# Patient Record
Sex: MALE | Race: BLACK OR AFRICAN AMERICAN | NOT HISPANIC OR LATINO | Employment: FULL TIME | URBAN - METROPOLITAN AREA
[De-identification: names, ages, dates, MRNs, and addresses within clinical notes are randomized per-mention and may not be internally consistent; named-entity substitution may affect disease eponyms.]

---

## 2024-01-10 ENCOUNTER — HOSPITAL ENCOUNTER (EMERGENCY)
Facility: HOSPITAL | Age: 54
Discharge: HOME | End: 2024-01-11
Attending: EMERGENCY MEDICINE
Payer: COMMERCIAL

## 2024-01-10 VITALS
SYSTOLIC BLOOD PRESSURE: 181 MMHG | HEART RATE: 80 BPM | DIASTOLIC BLOOD PRESSURE: 95 MMHG | HEIGHT: 71 IN | BODY MASS INDEX: 30.1 KG/M2 | RESPIRATION RATE: 18 BRPM | TEMPERATURE: 97.7 F | WEIGHT: 215 LBS | OXYGEN SATURATION: 97 %

## 2024-01-10 DIAGNOSIS — B34.9 VIRAL SYNDROME: Primary | ICD-10-CM

## 2024-01-10 PROCEDURE — 99283 EMERGENCY DEPT VISIT LOW MDM: CPT | Mod: 25

## 2024-01-10 PROCEDURE — 93010 ELECTROCARDIOGRAM REPORT: CPT | Performed by: EMERGENCY MEDICINE

## 2024-01-10 PROCEDURE — 99285 EMERGENCY DEPT VISIT HI MDM: CPT | Performed by: EMERGENCY MEDICINE

## 2024-01-10 PROCEDURE — 87636 SARSCOV2 & INF A&B AMP PRB: CPT

## 2024-01-10 PROCEDURE — 99284 EMERGENCY DEPT VISIT MOD MDM: CPT | Performed by: EMERGENCY MEDICINE

## 2024-01-10 ASSESSMENT — PAIN - FUNCTIONAL ASSESSMENT: PAIN_FUNCTIONAL_ASSESSMENT: 0-10

## 2024-01-10 ASSESSMENT — COLUMBIA-SUICIDE SEVERITY RATING SCALE - C-SSRS
6. HAVE YOU EVER DONE ANYTHING, STARTED TO DO ANYTHING, OR PREPARED TO DO ANYTHING TO END YOUR LIFE?: NO
2. HAVE YOU ACTUALLY HAD ANY THOUGHTS OF KILLING YOURSELF?: NO
1. IN THE PAST MONTH, HAVE YOU WISHED YOU WERE DEAD OR WISHED YOU COULD GO TO SLEEP AND NOT WAKE UP?: NO

## 2024-01-10 NOTE — Clinical Note
Jakob Rasmussen was seen and treated in our emergency department on 1/10/2024.  He may return to work on 01/12/2024.       If you have any questions or concerns, please don't hesitate to call.      Victorino Rosales MD MPH

## 2024-01-11 ENCOUNTER — CLINICAL SUPPORT (OUTPATIENT)
Dept: EMERGENCY MEDICINE | Facility: HOSPITAL | Age: 54
End: 2024-01-11
Payer: COMMERCIAL

## 2024-01-11 ENCOUNTER — APPOINTMENT (OUTPATIENT)
Dept: RADIOLOGY | Facility: HOSPITAL | Age: 54
End: 2024-01-11
Payer: COMMERCIAL

## 2024-01-11 LAB
ALBUMIN SERPL BCP-MCNC: 4.3 G/DL (ref 3.4–5)
ALP SERPL-CCNC: 81 U/L (ref 33–120)
ALT SERPL W P-5'-P-CCNC: 35 U/L (ref 10–52)
ANION GAP SERPL CALC-SCNC: 13 MMOL/L (ref 10–20)
AST SERPL W P-5'-P-CCNC: 28 U/L (ref 9–39)
ATRIAL RATE: 82 BPM
BASOPHILS # BLD AUTO: 0.04 X10*3/UL (ref 0–0.1)
BASOPHILS NFR BLD AUTO: 0.5 %
BILIRUB SERPL-MCNC: 0.7 MG/DL (ref 0–1.2)
BUN SERPL-MCNC: 11 MG/DL (ref 6–23)
CALCIUM SERPL-MCNC: 9.4 MG/DL (ref 8.6–10.6)
CARDIAC TROPONIN I PNL SERPL HS: <3 NG/L (ref 0–53)
CHLORIDE SERPL-SCNC: 102 MMOL/L (ref 98–107)
CO2 SERPL-SCNC: 28 MMOL/L (ref 21–32)
CREAT SERPL-MCNC: 0.78 MG/DL (ref 0.5–1.3)
EGFRCR SERPLBLD CKD-EPI 2021: >90 ML/MIN/1.73M*2
EOSINOPHIL # BLD AUTO: 0.03 X10*3/UL (ref 0–0.7)
EOSINOPHIL NFR BLD AUTO: 0.3 %
ERYTHROCYTE [DISTWIDTH] IN BLOOD BY AUTOMATED COUNT: 12.2 % (ref 11.5–14.5)
FLUAV RNA RESP QL NAA+PROBE: NOT DETECTED
FLUBV RNA RESP QL NAA+PROBE: NOT DETECTED
GLUCOSE SERPL-MCNC: 106 MG/DL (ref 74–99)
HCT VFR BLD AUTO: 37.8 % (ref 41–52)
HGB BLD-MCNC: 13.5 G/DL (ref 13.5–17.5)
IMM GRANULOCYTES # BLD AUTO: 0.02 X10*3/UL (ref 0–0.7)
IMM GRANULOCYTES NFR BLD AUTO: 0.2 % (ref 0–0.9)
LYMPHOCYTES # BLD AUTO: 2.18 X10*3/UL (ref 1.2–4.8)
LYMPHOCYTES NFR BLD AUTO: 24.8 %
MCH RBC QN AUTO: 37.1 PG (ref 26–34)
MCHC RBC AUTO-ENTMCNC: 35.7 G/DL (ref 32–36)
MCV RBC AUTO: 104 FL (ref 80–100)
MONOCYTES # BLD AUTO: 0.8 X10*3/UL (ref 0.1–1)
MONOCYTES NFR BLD AUTO: 9.1 %
NEUTROPHILS # BLD AUTO: 5.72 X10*3/UL (ref 1.2–7.7)
NEUTROPHILS NFR BLD AUTO: 65.1 %
NRBC BLD-RTO: 0 /100 WBCS (ref 0–0)
P AXIS: 75 DEGREES
P OFFSET: 215 MS
P ONSET: 155 MS
PLATELET # BLD AUTO: 258 X10*3/UL (ref 150–450)
POTASSIUM SERPL-SCNC: 3.8 MMOL/L (ref 3.5–5.3)
PR INTERVAL: 142 MS
PROT SERPL-MCNC: 8 G/DL (ref 6.4–8.2)
Q ONSET: 226 MS
QRS COUNT: 13 BEATS
QRS DURATION: 86 MS
QT INTERVAL: 382 MS
QTC CALCULATION(BAZETT): 446 MS
QTC FREDERICIA: 424 MS
R AXIS: 34 DEGREES
RBC # BLD AUTO: 3.64 X10*6/UL (ref 4.5–5.9)
SARS-COV-2 RNA RESP QL NAA+PROBE: DETECTED
SODIUM SERPL-SCNC: 139 MMOL/L (ref 136–145)
T AXIS: -8 DEGREES
T OFFSET: 417 MS
VENTRICULAR RATE: 82 BPM
WBC # BLD AUTO: 8.8 X10*3/UL (ref 4.4–11.3)

## 2024-01-11 PROCEDURE — 93005 ELECTROCARDIOGRAM TRACING: CPT

## 2024-01-11 PROCEDURE — 71045 X-RAY EXAM CHEST 1 VIEW: CPT | Mod: FOREIGN READ | Performed by: RADIOLOGY

## 2024-01-11 PROCEDURE — 2500000001 HC RX 250 WO HCPCS SELF ADMINISTERED DRUGS (ALT 637 FOR MEDICARE OP)

## 2024-01-11 PROCEDURE — 85025 COMPLETE CBC W/AUTO DIFF WBC: CPT

## 2024-01-11 PROCEDURE — 71045 X-RAY EXAM CHEST 1 VIEW: CPT

## 2024-01-11 PROCEDURE — 84484 ASSAY OF TROPONIN QUANT: CPT

## 2024-01-11 PROCEDURE — 80053 COMPREHEN METABOLIC PANEL: CPT

## 2024-01-11 PROCEDURE — 36415 COLL VENOUS BLD VENIPUNCTURE: CPT

## 2024-01-11 RX ORDER — IBUPROFEN 600 MG/1
600 TABLET ORAL ONCE
Status: COMPLETED | OUTPATIENT
Start: 2024-01-11 | End: 2024-01-11

## 2024-01-11 RX ADMIN — IBUPROFEN 600 MG: 600 TABLET, FILM COATED ORAL at 00:38

## 2024-01-11 NOTE — ED PROVIDER NOTES
HPI   Chief Complaint   Patient presents with    Flu Symptoms       Patient is a 53-year-old male with past medical history of hypertension, tobacco use presenting with concern for generalized bodyaches, malaise, cough, congestion, chest pain since Monday.  Patient endorses sick contact with similar symptoms and endorses he is concerned he might have COVID.  Patient particularly concerned about his chest pain that he describes as left-sided, front to back.  Patient endorses pain is sharp and does not report it particularly associates with breathing or coughing although he does endorse cough.        ------------------------------------------------------------------------------------------------------------------------------------------  PMH / PSH: as per HPI, otherwise reviewed in EMR and significant for: HTN, tobacco abuse  MEDS: as per HPI, otherwise reviewed in EMR and significant for: Previously on amlodipine but no current medications  ALLERGIES: as per HPI, otherwise reviewed in EMR and significant for: None per patient  SocH:  as per HPI, otherwise reviewed in EMR and significant for: Endorses long-term smoking history but quit on New years.  FH:  as per HPI, otherwise reviewed in EMR and significant for:                            [place] Coma Scale Score: 15                  Patient History   Past Medical History:   Diagnosis Date    Hypertension      Past Surgical History:   Procedure Laterality Date    CT ABDOMEN ANGIOGRAM W AND/OR WO IV CONTRAST  8/29/2019    CT ABDOMEN ANGIOGRAM W AND/OR WO IV CONTRAST 8[facility]29/2019 [facility]     No family history on file.  Social History     Tobacco Use    Smoking status: Unknown    Smokeless tobacco: Not on file   Substance Use Topics    Alcohol use: Not on file    Drug use: Not on file       Physical Exam   ED Triage Vitals [01/10/24 2250]   Temp Heart Rate Resp BP   36.5 °C (97.7 °F) 80 18 (!) 181/95      SpO2 Temp Source Heart Rate Source Patient Position    97 % Temporal -- --      BP Location FiO2 (%)     -- --       Physical Exam  Vitals and nursing note reviewed.   Constitutional:       General: He is not in acute distress.     Appearance: He is well-developed.   HENT:      Head: Normocephalic and atraumatic.      Mouth/Throat:      Comments: Moist mucous membranes.  Eyes:      Conjunctiva/sclera: Conjunctivae normal.   Cardiovascular:      Rate and Rhythm: Normal rate and regular rhythm.      Heart sounds: No murmur heard.  Pulmonary:      Effort: Pulmonary effort is normal. No respiratory distress.      Breath sounds: Normal breath sounds.   Abdominal:      Palpations: Abdomen is soft.      Tenderness: There is no abdominal tenderness.   Musculoskeletal:         General: No swelling.      Cervical back: Neck supple.   Skin:     General: Skin is warm and dry.      Capillary Refill: Capillary refill takes less than 2 seconds.   Neurological:      Mental Status: He is alert.   Psychiatric:         Mood and Affect: Mood normal.         ED Course & MDM   Diagnoses as of 01/11/24 0145   Viral syndrome       Medical Decision Making  EKG shows a normal rate of 82bpm, normal sinus rhythm, normal axis, normal intervals. Normal ST and T wave pattern with no evidence of acute ischemia or other acute findings.    Patient is a 53-year-old male with past medical history of hypertension, tobacco use presenting with concern for generalized bodyaches, malaise, cough, congestion, chest pain since Monday.  Constellation of symptoms most consistent with viral syndrome, in particular flu or COVID.  Patient treated symptomatically with ibuprofen.  Patient's chest pain is most probably secondary to his viral myalgia/costochondritis but given risk factors and description of sharp chest pain radiating from front to back, further cardiac evaluation was pursued.  EKG generally benign as above.  Point-of-care ultrasound with no evidence of aortic root or abdominal aorta dilation.  LV  systolic function grossly normal, with no effusion.  Troponin less than 3 and not trended given extended duration of chest pain.  Chest x-ray obtained with no evidence of bacterial superinfection or other acute cardiopulmonary pathology.   Symptomatic management at home discussed with patient.  Return precautions discussed with patient and patient discharged home.    Patient seen and discussed with Dr. Kobe Talley MD, PhD  Emergency Medicine PGY2            Procedure  Procedures     Dashawn Talley MD  Resident  01/11/24 0147

## 2024-01-11 NOTE — ED TRIAGE NOTES
Enters ED c/o 7/10 generalized muscle aches x several days. Denies sick contacts. Baseline history of HTN (non-compliant with prescribed medications.)

## 2024-01-11 NOTE — DISCHARGE INSTRUCTIONS
Your symptoms are most consistent with a viral illness.  We recommend symptomatic management with good hydration, acetaminophen, and ibuprofen.  There is no evidence of current heart disease on our assessment but we would recommend following with her primary care provider for continued management of your hypertension and helping with continued smoking cessation.  Your flu and COVID testing [facility] on [facility].

## 2024-05-21 ENCOUNTER — OFFICE VISIT (OUTPATIENT)
Dept: PRIMARY CARE | Facility: HOSPITAL | Age: 54
End: 2024-05-21
Payer: COMMERCIAL

## 2024-05-21 VITALS
WEIGHT: 218.9 LBS | DIASTOLIC BLOOD PRESSURE: 96 MMHG | BODY MASS INDEX: 30.65 KG/M2 | HEART RATE: 82 BPM | HEIGHT: 71 IN | SYSTOLIC BLOOD PRESSURE: 178 MMHG | OXYGEN SATURATION: 96 %

## 2024-05-21 DIAGNOSIS — Z87.891 FORMER SMOKER: ICD-10-CM

## 2024-05-21 DIAGNOSIS — D64.9 ANEMIA, UNSPECIFIED TYPE: ICD-10-CM

## 2024-05-21 DIAGNOSIS — L30.9 ECZEMA, UNSPECIFIED TYPE: ICD-10-CM

## 2024-05-21 DIAGNOSIS — N40.1 BENIGN PROSTATIC HYPERPLASIA WITH URINARY FREQUENCY: ICD-10-CM

## 2024-05-21 DIAGNOSIS — Z00.00 ROUTINE HEALTH MAINTENANCE: Primary | ICD-10-CM

## 2024-05-21 DIAGNOSIS — E78.5 DYSLIPIDEMIA: ICD-10-CM

## 2024-05-21 DIAGNOSIS — R35.0 BENIGN PROSTATIC HYPERPLASIA WITH URINARY FREQUENCY: ICD-10-CM

## 2024-05-21 DIAGNOSIS — I10 PRIMARY HYPERTENSION: ICD-10-CM

## 2024-05-21 PROCEDURE — 3077F SYST BP >= 140 MM HG: CPT | Performed by: INTERNAL MEDICINE

## 2024-05-21 PROCEDURE — 99396 PREV VISIT EST AGE 40-64: CPT | Performed by: INTERNAL MEDICINE

## 2024-05-21 PROCEDURE — 1036F TOBACCO NON-USER: CPT | Performed by: INTERNAL MEDICINE

## 2024-05-21 PROCEDURE — 3080F DIAST BP >= 90 MM HG: CPT | Performed by: INTERNAL MEDICINE

## 2024-05-21 RX ORDER — TRIAMCINOLONE ACETONIDE 1 MG/G
OINTMENT TOPICAL 2 TIMES DAILY PRN
Qty: 30 G | Refills: 3 | Status: SHIPPED | OUTPATIENT
Start: 2024-05-21

## 2024-05-21 RX ORDER — AMLODIPINE BESYLATE 5 MG/1
5 TABLET ORAL DAILY
Qty: 30 TABLET | Refills: 11 | Status: SHIPPED | OUTPATIENT
Start: 2024-05-21 | End: 2024-06-04 | Stop reason: DRUGHIGH

## 2024-05-21 ASSESSMENT — ENCOUNTER SYMPTOMS
OCCASIONAL FEELINGS OF UNSTEADINESS: 0
LOSS OF SENSATION IN FEET: 0
DEPRESSION: 0

## 2024-05-21 NOTE — PROGRESS NOTES
"Chief Complaint   Patient presents with    [facility]     New patient         History Of Present Illness:    Jakob Rasmussen is a 53 y.o. male presenting to establish care, update health maintenance and address elevated blood pressure.  Jakob has a history of high blood pressure treated with amlodipine.  He has not seen his primary care doctor recently and has been off of amlodipine for couple of years.  Recently has been experiencing retro-orbital, temporal and occipital headaches.  He has a family history of aneurysms and that triggered him to come in and get his blood pressure checked.  His headaches are associate with nausea, photophobia and phonophobia.  His headaches are never exertional.  He also reports intermittent vision changes.  He occasionally sees \"gutierrez of light\" across his visual fields.  They happen 2-3 times per month.  He had an eye exam about a year ago and the provider told him that his exam was normal.  Is unclear if he met with an optometrist or ophthalmologist.  Jakob was evaluated for abdominal pain in the emergency room in January.  His lab evaluation demonstrated macrocytic anemia.  He is a longstanding smoker but quit over the past year.  He has never had lung cancer screening.  He is not coughing or feeling out of breath.  She of eczema and now has 2 \"rough patches on his right ankle.  He suspects that the rough patches are eczema.    Echocardiogram 8/29/19  CONCLUSIONS:   1. The left ventricular systolic function is normal with a 55-60% estimated ejection fraction.   2. RVSP within normal limits.   3. No prior echocardiogram available for comparison.    8/29/19  CT angio abdomen/chest   Vascular:  1. No acute vascular pathology. Specifically there is no evidence of  aortic dissection.   Infrarenal abdominal aorta demonstrates diffuse  atherosclerotic change without significant focal stenosis or aneurysm.  Right common iliac artery demonstrates diffuse atherosclerotic " change  without significant focal stenosis.     Nonvascular:  1. There is a 3.8 cm stone noted at the gallbladder neck. Gallbladder  is also distended. Early acute cholecystitis cannot be excluded.  There are also areas of hyperemia in the liver adjacent to the  gallbladder which could be hyperemic reactive to inflammatory process  in the gallbladder. Further evaluation by obtaining right upper  quadrant ultrasound is recommended for better characterization if  clinically indicated.      Active Medical Problems:  Patient Active Problem List    Diagnosis Date Noted    Peripheral vascular disease (CMS-HCC) 2024    Anemia 2024    Benign prostatic hyperplasia with urinary frequency 2024    Dyslipidemia 2024    Former smoker 2024    Routine health maintenance 2024    Hypertension 2024    Eczema 2024       Past Medical History:  Past Medical History:   Diagnosis Date    Acute calculous cholecystitis 2019    Eczema     Hypertension     Peripheral vascular disease (CMS-HCC)     found incidentally on CTA 19       Past Surgical History:  Past Surgical History:   Procedure Laterality Date    CHOLECYSTECTOMY      CT ABDOMEN ANGIOGRAM W AND/OR WO IV CONTRAST  2019    CT ABDOMEN ANGIOGRAM W AND/OR WO IV CONTRAST 2019 [facility]         Social History:  Social History     Tobacco Use    Smoking status: Former     Current packs/day: 0.00     Types: Cigarettes     Quit date: 1993     Years since quittin.4    Smokeless tobacco: Never   Vaping Use    Vaping status: Never Used   Substance Use Topics    Alcohol use: Yes     Alcohol/week: 2.0 standard drinks of alcohol     Types: 2 Standard drinks or equivalent per week     Comment: occasional/weekends         Family History:  Family History   Problem Relation Name Age of Onset    Diabetes Mother      Hypertension Mother      Aneurysm Mother      Hypertension Father      Other (pacemaker) Father       "Diabetes Sister      Other (HTN) Sister      Diabetes Brother      Other (HTN) Brother      Stroke Brother      Diabetes Brother      Other (HTN) Brother      Diabetes Brother      Other (HTN) Brother      No Known Problems Daughter 6     No Known Problems Son 1         Allergies:  Patient has no known allergies.    Outpatient Medications:    Current Outpatient Medications:     amLODIPine (Norvasc) 5 mg tablet, Take 1 tablet (5 mg) by mouth once daily., Disp: 30 tablet, Rfl: 11    triamcinolone (Kenalog) 0.1 % ointment, Apply topically 2 times a day as needed for irritation or rash., Disp: 30 g, Rfl: 3    Review of Systems:  Pertinent positives in review of systems outlined above.  Complete ROS otherwise negative.        Last Recorded Vitals:  Vitals:    05/21/24 1504 05/21/24 1540   BP: (!) 185/100 (!) 178/96   BP Location: Left arm    Patient Position: Sitting    BP Cuff Size: Large adult    Pulse: 82    SpO2: 96%    Weight: 99.3 kg (218 lb 14.4 oz)    Height: 1.803 m (5' 11\")    Body mass index is 30.53 kg/m².        Physical Exam  Vitals reviewed.   Constitutional:       Appearance: Normal appearance.   HENT:      Mouth/Throat:      Pharynx: Oropharynx is clear.   Eyes:      Extraocular Movements: Extraocular movements intact.      Conjunctiva/sclera: Conjunctivae normal.      Pupils: Pupils are equal, round, and reactive to light.   Neck:      Vascular: No carotid bruit.   Cardiovascular:      Rate and Rhythm: Normal rate and regular rhythm.   Pulmonary:      Effort: Pulmonary effort is normal.      Breath sounds: Normal breath sounds.   Abdominal:      General: Abdomen is flat. Bowel sounds are normal.      Palpations: Abdomen is soft. There is no mass.      Tenderness: There is no abdominal tenderness. There is no right CVA tenderness or left CVA tenderness.   Musculoskeletal:      Cervical back: No tenderness.      Right lower leg: No edema.      Left lower leg: No edema.   Lymphadenopathy:      Cervical: No " cervical adenopathy.   Skin:     Comments: Well-circumscribed 3 to 4 cm patch on the right medial ankle with superficial scaling.  1 to 2 cm well-circumscribed patch with superficial scaling on right lateral calf.   Neurological:      General: No focal deficit present.      Mental Status: He is alert and oriented to person, place, and time.   Psychiatric:         Mood and Affect: Mood normal.          Assessment/Plan   Problem List Items Addressed This Visit       Hypertension     Comprehensive metabolic panel, TSH and urinalysis will be obtained now.  Jakob will restart amlodipine 5 mg daily.  Blood pressure will be assessed in 2 weeks.  Jakob has significant hypertension and a family history of aneurysms, and CNS imaging will be arranged if headaches do not resolve with improved control of blood pressure.         Relevant Medications    amLODIPine (Norvasc) 5 mg tablet    Other Relevant Orders    Comprehensive Metabolic Panel    TSH with reflex to Free T4 if abnormal    CBC and Auto Differential    Urinalysis with Reflex Microscopic    Eczema    Relevant Medications    triamcinolone (Kenalog) 0.1 % ointment    Other Relevant Orders    Comprehensive Metabolic Panel    Anemia    Relevant Orders    Comprehensive Metabolic Panel    CBC and Auto Differential    Ferritin    Iron and TIBC    Transferrin    Vitamin B12    Benign prostatic hyperplasia with urinary frequency     Will check PSA and urinalysis.  Symptoms are mild.  We will discuss adding daily tadalafil at his follow-up visit.         Relevant Orders    Prostate Specific Antigen    Dyslipidemia     Blood profile due now.         Relevant Orders    Comprehensive Metabolic Panel    Lipid Panel    Former smoker    Relevant Orders    CT lung screening low dose    Routine health maintenance - Primary     Blood pressure elevated and addressed separately.  Fasting blood sugar and fasting lipid profile will be arranged now.  PSA screening will be included with  blood work.  Low-dose CT chest will be obtained for lung cancer screening.  Colon cancer screen will be updated at follow-up visit.  Eye exam recommended and referral to ophthalmology placed.  Immunizations will be addressed at next visit.              Ramon Shaw MD

## 2024-05-22 ENCOUNTER — LAB (OUTPATIENT)
Dept: LAB | Facility: LAB | Age: 54
End: 2024-05-22
Payer: COMMERCIAL

## 2024-05-22 DIAGNOSIS — L30.9 ECZEMA, UNSPECIFIED TYPE: ICD-10-CM

## 2024-05-22 DIAGNOSIS — I10 PRIMARY HYPERTENSION: ICD-10-CM

## 2024-05-22 DIAGNOSIS — R35.0 BENIGN PROSTATIC HYPERPLASIA WITH URINARY FREQUENCY: ICD-10-CM

## 2024-05-22 DIAGNOSIS — N40.1 BENIGN PROSTATIC HYPERPLASIA WITH URINARY FREQUENCY: ICD-10-CM

## 2024-05-22 DIAGNOSIS — D64.9 ANEMIA, UNSPECIFIED TYPE: ICD-10-CM

## 2024-05-22 DIAGNOSIS — E78.5 DYSLIPIDEMIA: ICD-10-CM

## 2024-05-22 PROBLEM — Z87.891 FORMER SMOKER: Status: ACTIVE | Noted: 2024-05-22

## 2024-05-22 PROBLEM — I73.9 PERIPHERAL VASCULAR DISEASE (CMS-HCC): Status: ACTIVE | Noted: 2024-05-22

## 2024-05-22 PROBLEM — Z00.00 ROUTINE HEALTH MAINTENANCE: Status: ACTIVE | Noted: 2024-05-22

## 2024-05-22 LAB
ALBUMIN SERPL BCP-MCNC: 4.1 G/DL (ref 3.4–5)
ALP SERPL-CCNC: 68 U/L (ref 33–120)
ALT SERPL W P-5'-P-CCNC: 14 U/L (ref 10–52)
ANION GAP SERPL CALC-SCNC: 13 MMOL/L (ref 10–20)
APPEARANCE UR: CLEAR
AST SERPL W P-5'-P-CCNC: 16 U/L (ref 9–39)
BASOPHILS # BLD AUTO: 0.04 X10*3/UL (ref 0–0.1)
BASOPHILS NFR BLD AUTO: 0.5 %
BILIRUB SERPL-MCNC: 0.8 MG/DL (ref 0–1.2)
BILIRUB UR STRIP.AUTO-MCNC: NEGATIVE MG/DL
BUN SERPL-MCNC: 15 MG/DL (ref 6–23)
CALCIUM SERPL-MCNC: 9.3 MG/DL (ref 8.6–10.6)
CHLORIDE SERPL-SCNC: 102 MMOL/L (ref 98–107)
CHOLEST SERPL-MCNC: 185 MG/DL (ref 0–199)
CHOLESTEROL/HDL RATIO: 5
CO2 SERPL-SCNC: 28 MMOL/L (ref 21–32)
COLOR UR: ABNORMAL
CREAT SERPL-MCNC: 0.99 MG/DL (ref 0.5–1.3)
EGFRCR SERPLBLD CKD-EPI 2021: 90 ML/MIN/1.73M*2
EOSINOPHIL # BLD AUTO: 0.1 X10*3/UL (ref 0–0.7)
EOSINOPHIL NFR BLD AUTO: 1.3 %
ERYTHROCYTE [DISTWIDTH] IN BLOOD BY AUTOMATED COUNT: 12.8 % (ref 11.5–14.5)
FERRITIN SERPL-MCNC: 219 NG/ML (ref 20–300)
GLUCOSE SERPL-MCNC: 98 MG/DL (ref 74–99)
GLUCOSE UR STRIP.AUTO-MCNC: NORMAL MG/DL
HCT VFR BLD AUTO: 40.2 % (ref 41–52)
HDLC SERPL-MCNC: 37.3 MG/DL
HGB BLD-MCNC: 13.9 G/DL (ref 13.5–17.5)
IMM GRANULOCYTES # BLD AUTO: 0.03 X10*3/UL (ref 0–0.7)
IMM GRANULOCYTES NFR BLD AUTO: 0.4 % (ref 0–0.9)
IRON SATN MFR SERPL: 26 % (ref 25–45)
IRON SERPL-MCNC: 94 UG/DL (ref 35–150)
KETONES UR STRIP.AUTO-MCNC: NEGATIVE MG/DL
LDLC SERPL CALC-MCNC: 96 MG/DL
LEUKOCYTE ESTERASE UR QL STRIP.AUTO: NEGATIVE
LYMPHOCYTES # BLD AUTO: 3.25 X10*3/UL (ref 1.2–4.8)
LYMPHOCYTES NFR BLD AUTO: 42.3 %
MCH RBC QN AUTO: 34 PG (ref 26–34)
MCHC RBC AUTO-ENTMCNC: 34.6 G/DL (ref 32–36)
MCV RBC AUTO: 98 FL (ref 80–100)
MONOCYTES # BLD AUTO: 0.57 X10*3/UL (ref 0.1–1)
MONOCYTES NFR BLD AUTO: 7.4 %
NEUTROPHILS # BLD AUTO: 3.69 X10*3/UL (ref 1.2–7.7)
NEUTROPHILS NFR BLD AUTO: 48.1 %
NITRITE UR QL STRIP.AUTO: NEGATIVE
NON HDL CHOLESTEROL: 148 MG/DL (ref 0–149)
NRBC BLD-RTO: 0 /100 WBCS (ref 0–0)
PH UR STRIP.AUTO: 6 [PH]
PLATELET # BLD AUTO: 309 X10*3/UL (ref 150–450)
POTASSIUM SERPL-SCNC: 4 MMOL/L (ref 3.5–5.3)
PROT SERPL-MCNC: 7.7 G/DL (ref 6.4–8.2)
PROT UR STRIP.AUTO-MCNC: NEGATIVE MG/DL
PSA SERPL-MCNC: 0.56 NG/ML
RBC # BLD AUTO: 4.09 X10*6/UL (ref 4.5–5.9)
RBC # UR STRIP.AUTO: ABNORMAL /UL
RBC #/AREA URNS AUTO: NORMAL /HPF
SODIUM SERPL-SCNC: 139 MMOL/L (ref 136–145)
SP GR UR STRIP.AUTO: 1.02
TIBC SERPL-MCNC: 356 UG/DL (ref 240–445)
TRANSFERRIN SERPL-MCNC: 247 MG/DL (ref 200–360)
TRIGL SERPL-MCNC: 260 MG/DL (ref 0–149)
TSH SERPL-ACNC: 1.05 MIU/L (ref 0.44–3.98)
UIBC SERPL-MCNC: 262 UG/DL (ref 110–370)
UROBILINOGEN UR STRIP.AUTO-MCNC: NORMAL MG/DL
VIT B12 SERPL-MCNC: 142 PG/ML (ref 211–911)
VLDL: 52 MG/DL (ref 0–40)
WBC # BLD AUTO: 7.7 X10*3/UL (ref 4.4–11.3)
WBC #/AREA URNS AUTO: NORMAL /HPF

## 2024-05-22 PROCEDURE — 82607 VITAMIN B-12: CPT

## 2024-05-22 PROCEDURE — 84153 ASSAY OF PSA TOTAL: CPT

## 2024-05-22 PROCEDURE — 82728 ASSAY OF FERRITIN: CPT

## 2024-05-22 PROCEDURE — 80061 LIPID PANEL: CPT

## 2024-05-22 PROCEDURE — 85025 COMPLETE CBC W/AUTO DIFF WBC: CPT

## 2024-05-22 PROCEDURE — 36415 COLL VENOUS BLD VENIPUNCTURE: CPT

## 2024-05-22 PROCEDURE — 83550 IRON BINDING TEST: CPT

## 2024-05-22 PROCEDURE — 81001 URINALYSIS AUTO W/SCOPE: CPT

## 2024-05-22 PROCEDURE — 80053 COMPREHEN METABOLIC PANEL: CPT

## 2024-05-22 PROCEDURE — 84443 ASSAY THYROID STIM HORMONE: CPT

## 2024-05-22 PROCEDURE — 83540 ASSAY OF IRON: CPT

## 2024-05-22 PROCEDURE — 84466 ASSAY OF TRANSFERRIN: CPT

## 2024-05-22 NOTE — ASSESSMENT & PLAN NOTE
Blood pressure elevated and addressed separately.  Fasting blood sugar and fasting lipid profile will be arranged now.  PSA screening will be included with blood work.  Low-dose CT chest will be obtained for lung cancer screening.  Colon cancer screen will be updated at follow-up visit.  Eye exam recommended and referral to ophthalmology placed.  Immunizations will be addressed at next visit.

## 2024-05-22 NOTE — ASSESSMENT & PLAN NOTE
Comprehensive metabolic panel, TSH and urinalysis will be obtained now.  Jakob will restart amlodipine 5 mg daily.  Blood pressure will be assessed in 2 weeks.  Jakob has significant hypertension and a family history of aneurysms, and CNS imaging will be arranged if headaches do not resolve with improved control of blood pressure.

## 2024-05-22 NOTE — ASSESSMENT & PLAN NOTE
Will check PSA and urinalysis.  Symptoms are mild.  We will discuss adding daily tadalafil at his follow-up visit.

## 2024-06-04 ENCOUNTER — APPOINTMENT (OUTPATIENT)
Dept: PRIMARY CARE | Facility: HOSPITAL | Age: 54
End: 2024-06-04
Payer: COMMERCIAL

## 2024-06-04 ENCOUNTER — OFFICE VISIT (OUTPATIENT)
Dept: PRIMARY CARE | Facility: HOSPITAL | Age: 54
End: 2024-06-04
Payer: COMMERCIAL

## 2024-06-04 VITALS
HEART RATE: 91 BPM | OXYGEN SATURATION: 98 % | WEIGHT: 214.3 LBS | DIASTOLIC BLOOD PRESSURE: 80 MMHG | SYSTOLIC BLOOD PRESSURE: 134 MMHG | BODY MASS INDEX: 29.89 KG/M2

## 2024-06-04 DIAGNOSIS — R11.0 NAUSEA: ICD-10-CM

## 2024-06-04 DIAGNOSIS — R31.21 ASYMPTOMATIC MICROSCOPIC HEMATURIA: ICD-10-CM

## 2024-06-04 DIAGNOSIS — I10 HYPERTENSION, UNSPECIFIED TYPE: Primary | ICD-10-CM

## 2024-06-04 DIAGNOSIS — R23.9 SKIN CHANGE: ICD-10-CM

## 2024-06-04 DIAGNOSIS — E53.8 B12 DEFICIENCY: ICD-10-CM

## 2024-06-04 PROCEDURE — 99214 OFFICE O/P EST MOD 30 MIN: CPT | Performed by: INTERNAL MEDICINE

## 2024-06-04 RX ORDER — PNV NO.95/FERROUS FUM/FOLIC AC 28MG-0.8MG
100 TABLET ORAL DAILY
Qty: 30 TABLET | Refills: 11 | Status: SHIPPED | OUTPATIENT
Start: 2024-06-04 | End: 2025-06-04

## 2024-06-04 RX ORDER — AMLODIPINE BESYLATE 10 MG/1
10 TABLET ORAL DAILY
Qty: 30 TABLET | Refills: 5 | Status: SHIPPED | OUTPATIENT
Start: 2024-06-04 | End: 2024-12-01

## 2024-06-04 NOTE — PROGRESS NOTES
"Chief Complaint:   Follow-up (Go lab results)     History Of Present Illness:    Jakob Rasmussen is a 53 y.o. male with active medical problems outlined below who presents for blood pressure check.     INITIAL VISIT 5/21/24    Jakob has a history of high blood pressure treated with amlodipine.  He has not seen his primary care doctor recently and has been off of amlodipine for couple of years.  Recently has been experiencing retro-orbital, temporal and occipital headaches.  He has a family history of aneurysms and that triggered him to come in and get his blood pressure checked.  His headaches are associate with nausea, photophobia and phonophobia.  His headaches are never exertional.  He also reports intermittent vision changes.  He occasionally sees \"gutierrez of light\" across his visual fields.  They happen 2-3 times per month.  He had an eye exam about a year ago and the provider told him that his exam was normal.  Is unclear if he met with an optometrist or ophthalmologist.  Jakob was evaluated for abdominal pain in the emergency room in January.  His lab evaluation demonstrated macrocytic anemia.  He is a longstanding smoker and smoked more than one pack per day for over 30 years - quit over the past year.  He has never had lung cancer screening.  He is not coughing or feeling out of breath.  She of eczema and now has 2 \"rough patches on his right ankle.  He suspects that the rough patches are eczema.    Echocardiogram 8/29/19  CONCLUSIONS:   1. The left ventricular systolic function is normal with a 55-60% estimated ejection fraction.   2. RVSP within normal limits.   3. No prior echocardiogram available for comparison.    8/29/19  CT angio abdomen/chest   Vascular:  1. No acute vascular pathology. Specifically there is no evidence of  aortic dissection.   Infrarenal abdominal aorta demonstrates diffuse  atherosclerotic change without significant focal stenosis or aneurysm.  Right common iliac artery " demonstrates diffuse atherosclerotic change  without significant focal stenosis.     Nonvascular:  1. There is a 3.8 cm stone noted at the gallbladder neck. Gallbladder  is also distended. Early acute cholecystitis cannot be excluded.  There are also areas of hyperemia in the liver adjacent to the  gallbladder which could be hyperemic reactive to inflammatory process  in the gallbladder. Further evaluation by obtaining right upper  quadrant ultrasound is recommended for better characterization if  clinically indicated.      INTERVAL HISTORY 24  Recently started amlodipine - no side effects and no recent headaches   New B12 deficiency on labs- some nausea, no vomiting, no bloating/cramping or loose stool.  Recent UA showed 3-5 RBC.  No symptoms but has hx of smoking.  Hyperpigmented macules on ankle are unchanged.  No response to topical steroid cream.      Patient Active Problem List   Diagnosis    Hypertension    Eczema    Peripheral vascular disease (CMS-HCC)    Anemia    Benign prostatic hyperplasia with urinary frequency    Dyslipidemia    Former smoker    Routine health maintenance    B12 deficiency    Nausea    Skin change    Asymptomatic microscopic hematuria       Current Outpatient Medications:     triamcinolone (Kenalog) 0.1 % ointment, Apply topically 2 times a day as needed for irritation or rash., Disp: 30 g, Rfl: 3    amLODIPine (Norvasc) 10 mg tablet, Take 1 tablet (10 mg) by mouth once daily., Disp: 30 tablet, Rfl: 5    clobetasol (Temovate) 0.05 % ointment, Use daily for 6 weeks in evening, can cover with band aid or saran wrap at night time, Disp: 60 g, Rfl: 2    cyanocobalamin (Vitamin B-12) 100 mcg tablet, Take 1 tablet (100 mcg) by mouth once daily., Disp: 30 tablet, Rfl: 11  Patient has no known allergies.  Social History     Tobacco Use    Smoking status: Former     Current packs/day: 0.00     Types: Cigarettes     Quit date: 1993     Years since quittin.4    Smokeless tobacco:  Never   Vaping Use    Vaping status: Never Used   Substance Use Topics    Alcohol use: Yes     Alcohol/week: 2.0 standard drinks of alcohol     Types: 2 Standard drinks or equivalent per week     Comment: occasional/weekends         All pertinent aspects of medical and surgical history were reviewed and updated in chart    Review of Systems   Pertinent positives in review of systems outlined above.  Complete ROS otherwise negative.        Last Recorded Vitals:  No data found.         Physical Exam  Eyes:      Extraocular Movements: Extraocular movements intact.      Conjunctiva/sclera: Conjunctivae normal.      Pupils: Pupils are equal, round, and reactive to light.   Cardiovascular:      Rate and Rhythm: Normal rate and regular rhythm.      Pulses: Normal pulses.      Heart sounds: Normal heart sounds.   Pulmonary:      Effort: Pulmonary effort is normal.      Breath sounds: Normal breath sounds.   Musculoskeletal:      Right lower leg: No edema.      Left lower leg: No edema.          The 10-year ASCVD risk score (Irina FLOR, et al., 2019) is: 12.7%    Values used to calculate the score:      Age: 53 years      Sex: Male      Is Non- : Yes      Diabetic: No      Tobacco smoker: No      Systolic Blood Pressure: 134 mmHg      Is BP treated: Yes      HDL Cholesterol: 37.3 mg/dL      Total Cholesterol: 185 mg/dL      Assessment/Plan   Problem List Items Addressed This Visit       Hypertension - Primary     Will increase amlodipine to 10mg.  Repeat BP in one month         Relevant Medications    amLODIPine (Norvasc) 10 mg tablet    B12 deficiency     To begin oral B12.  Will check TTG Ab with next labs.  Will screen for H Pylori         Relevant Medications    cyanocobalamin (Vitamin B-12) 100 mcg tablet    Other Relevant Orders    CBC and Auto Differential    Vitamin B12    Tissue Transglutaminase IgA    Anti-Parietal Cell Antibody    H. pylori antigen, stool    Nausea     Has nausea and new B12  def. Will screen for H Pylori.          Relevant Orders    H. pylori antigen, stool    Skin change    Relevant Orders    Referral to Dermatology    Asymptomatic microscopic hematuria     Has 3-5 RBC on UA.  Will arrange CTU and refer to urology.           Relevant Orders    CT urography w 3D volume rendered imaging    Referral to Urology       A total of 30 minutes was spent reviewing the chart and recent testing and discussing plan of care.         Ramon Shaw MD

## 2024-06-04 NOTE — PATIENT INSTRUCTIONS
Increase amlodipine to 10mg daily     Follow up with dermatology    Begin B12 once daily.  Follow up blood work in 6 weeks    Stool test now     Eat 5 servings of fruits and veg per day , 35 grams of fiber per day (quinoa,farro,lentis)  Avoid: red meat, processed meat (deli and cured meats), saturated fat, sugar     Movies on Netflix:  The Game Changers  Blue Zones  You Are What You Eat  What the Health  [place] Over MyDoc    Web Sites for Recipes:  Blue Zones  [place] Over Knives  Plant Strong   Nutritionfacts.org     Authors:  Dr. Tito Urbina     Cookbooks:  The Get Healthy, Go Vegan Cookbook: 125 Easy and Delicious Recipes to Jump-Start Weight Loss and Help you Feel Great - Dr. Tito Rashid's Cookbook for Reversing Diabetes:  150 Recipes Scientifically Proven to Reverse Diabetes Without Drugs - Dr. Tito Rashid  The Prevent and Reverse Heart Disease Cookbook - Dr. Brandyn Rangel   The [place] Study All-Star Collection:  Whole Food, Plant-Based Recipes from Your Favorite Vegan  - Dr. LETA Galicia  How Not To Die - Dr. Donnie Foster

## 2024-06-05 ENCOUNTER — APPOINTMENT (OUTPATIENT)
Dept: RADIOLOGY | Facility: HOSPITAL | Age: 54
End: 2024-06-05
Payer: COMMERCIAL

## 2024-06-06 PROBLEM — E53.8 B12 DEFICIENCY: Status: ACTIVE | Noted: 2024-06-06

## 2024-06-06 PROBLEM — R31.21 ASYMPTOMATIC MICROSCOPIC HEMATURIA: Status: ACTIVE | Noted: 2024-06-06

## 2024-06-06 PROBLEM — R23.9 SKIN CHANGE: Status: ACTIVE | Noted: 2024-06-06

## 2024-06-06 PROBLEM — R11.0 NAUSEA: Status: ACTIVE | Noted: 2024-06-06

## 2024-06-11 ENCOUNTER — APPOINTMENT (OUTPATIENT)
Dept: DERMATOLOGY | Facility: CLINIC | Age: 54
End: 2024-06-11
Payer: COMMERCIAL

## 2024-06-18 ENCOUNTER — APPOINTMENT (OUTPATIENT)
Dept: DERMATOLOGY | Facility: CLINIC | Age: 54
End: 2024-06-18
Payer: COMMERCIAL

## 2024-06-18 DIAGNOSIS — L28.0 LICHEN SIMPLEX CHRONICUS: Primary | ICD-10-CM

## 2024-06-18 PROCEDURE — 99204 OFFICE O/P NEW MOD 45 MIN: CPT | Performed by: STUDENT IN AN ORGANIZED HEALTH CARE EDUCATION/TRAINING PROGRAM

## 2024-06-18 RX ORDER — CLOBETASOL PROPIONATE 0.5 MG/G
OINTMENT TOPICAL
Qty: 60 G | Refills: 2 | Status: SHIPPED | OUTPATIENT
Start: 2024-06-18

## 2024-06-18 ASSESSMENT — DERMATOLOGY PATIENT ASSESSMENT
HAVE YOU HAD OR DO YOU HAVE A STAPH INFECTION: NO
HAVE YOU HAD OR DO YOU HAVE VASCULAR DISEASE: NO
ARE YOU AN ORGAN TRANSPLANT RECIPIENT: NO
DO YOU USE A TANNING BED: NO
DO YOU HAVE ANY NEW OR CHANGING LESIONS: YES

## 2024-06-18 ASSESSMENT — DERMATOLOGY QUALITY OF LIFE (QOL) ASSESSMENT
ARE THERE EXCLUSIONS OR EXCEPTIONS FOR THE QUALITY OF LIFE ASSESSMENT: NO
DATE THE QUALITY-OF-LIFE ASSESSMENT WAS COMPLETED: 67009
RATE HOW BOTHERED YOU ARE BY SYMPTOMS OF YOUR SKIN PROBLEM (EG, ITCHING, STINGING BURNING, HURTING OR SKIN IRRITATION): 0 - NEVER BOTHERED
RATE HOW BOTHERED YOU ARE BY EFFECTS OF YOUR SKIN PROBLEMS ON YOUR ACTIVITIES (EG, GOING OUT, ACCOMPLISHING WHAT YOU WANT, WORK ACTIVITIES OR YOUR RELATIONSHIPS WITH OTHERS): 0 - NEVER BOTHERED
RATE HOW EMOTIONALLY BOTHERED YOU ARE BY YOUR SKIN PROBLEM (FOR EXAMPLE, WORRY, EMBARRASSMENT, FRUSTRATION): 0 - NEVER BOTHERED

## 2024-06-18 ASSESSMENT — ITCH NUMERIC RATING SCALE: HOW SEVERE IS YOUR ITCHING?: 10

## 2024-06-18 ASSESSMENT — PATIENT GLOBAL ASSESSMENT (PGA): PATIENT GLOBAL ASSESSMENT: PATIENT GLOBAL ASSESSMENT:  1 - CLEAR

## 2024-06-18 NOTE — PROGRESS NOTES
Subjective     Jakob Rasmussen is a 53 y.o. male who presents for the following: Suspicious Skin Lesion (Right foot ).     Review of Systems:  No other skin or systemic complaints other than what is documented elsewhere in the note.    The following portions of the chart were reviewed this encounter and updated as appropriate:          Skin Cancer History  No skin cancer on file.      Specialty Problems          Dermatology Problems    Eczema    Skin change        Objective   Well appearing patient in no apparent distress; mood and affect are within normal limits.    A focused skin examination was performed. All findings within normal limits unless otherwise noted below.    Assessment/Plan   1. Lichen simplex chronicus  Flaring,new diagnosis  Right Ankle - Anterior  Lichenified brown/black velvety plaque , several, annular  clobetasol (Temovate) 0.05 % ointment - Right Ankle - Anterior  Use daily for 6 weeks in evening, can cover with band aid or saran wrap at night time  Notify me if not improving  May re-occur in future and can re-apply clobetasol for a few weeks on legs if needed for future flares

## 2024-06-26 ENCOUNTER — HOSPITAL ENCOUNTER (OUTPATIENT)
Dept: RADIOLOGY | Facility: HOSPITAL | Age: 54
Discharge: HOME | End: 2024-06-26
Payer: COMMERCIAL

## 2024-06-26 DIAGNOSIS — Z87.891 FORMER SMOKER: ICD-10-CM

## 2024-06-26 PROCEDURE — 71271 CT THORAX LUNG CANCER SCR C-: CPT

## 2024-06-28 ENCOUNTER — HOSPITAL ENCOUNTER (OUTPATIENT)
Dept: RADIOLOGY | Facility: HOSPITAL | Age: 54
Discharge: HOME | End: 2024-06-28
Payer: COMMERCIAL

## 2024-06-28 DIAGNOSIS — R31.21 ASYMPTOMATIC MICROSCOPIC HEMATURIA: ICD-10-CM

## 2024-06-28 PROCEDURE — 76377 3D RENDER W/INTRP POSTPROCES: CPT

## 2024-06-28 PROCEDURE — 2550000001 HC RX 255 CONTRASTS: Performed by: INTERNAL MEDICINE

## 2024-08-28 ENCOUNTER — OFFICE VISIT (OUTPATIENT)
Dept: PRIMARY CARE | Facility: CLINIC | Age: 54
End: 2024-08-28
Payer: COMMERCIAL

## 2024-08-28 ENCOUNTER — LAB (OUTPATIENT)
Dept: LAB | Facility: LAB | Age: 54
End: 2024-08-28
Payer: COMMERCIAL

## 2024-08-28 VITALS
OXYGEN SATURATION: 99 % | DIASTOLIC BLOOD PRESSURE: 84 MMHG | TEMPERATURE: 98.8 F | HEART RATE: 92 BPM | WEIGHT: 222.4 LBS | BODY MASS INDEX: 31.02 KG/M2 | SYSTOLIC BLOOD PRESSURE: 134 MMHG

## 2024-08-28 DIAGNOSIS — R31.29 MICROSCOPIC HEMATURIA: Primary | ICD-10-CM

## 2024-08-28 DIAGNOSIS — E53.8 B12 DEFICIENCY: ICD-10-CM

## 2024-08-28 DIAGNOSIS — G56.03 BILATERAL CARPAL TUNNEL SYNDROME: ICD-10-CM

## 2024-08-28 DIAGNOSIS — I10 PRIMARY HYPERTENSION: ICD-10-CM

## 2024-08-28 LAB
BASOPHILS # BLD AUTO: 0.05 X10*3/UL (ref 0–0.1)
BASOPHILS NFR BLD AUTO: 0.6 %
EOSINOPHIL # BLD AUTO: 0.07 X10*3/UL (ref 0–0.7)
EOSINOPHIL NFR BLD AUTO: 0.8 %
ERYTHROCYTE [DISTWIDTH] IN BLOOD BY AUTOMATED COUNT: 12.7 % (ref 11.5–14.5)
HCT VFR BLD AUTO: 42.5 % (ref 41–52)
HGB BLD-MCNC: 14.4 G/DL (ref 13.5–17.5)
IMM GRANULOCYTES # BLD AUTO: 0.04 X10*3/UL (ref 0–0.7)
IMM GRANULOCYTES NFR BLD AUTO: 0.4 % (ref 0–0.9)
LYMPHOCYTES # BLD AUTO: 3.28 X10*3/UL (ref 1.2–4.8)
LYMPHOCYTES NFR BLD AUTO: 36.6 %
MCH RBC QN AUTO: 33.4 PG (ref 26–34)
MCHC RBC AUTO-ENTMCNC: 33.9 G/DL (ref 32–36)
MCV RBC AUTO: 99 FL (ref 80–100)
MONOCYTES # BLD AUTO: 0.67 X10*3/UL (ref 0.1–1)
MONOCYTES NFR BLD AUTO: 7.5 %
NEUTROPHILS # BLD AUTO: 4.85 X10*3/UL (ref 1.2–7.7)
NEUTROPHILS NFR BLD AUTO: 54.1 %
NRBC BLD-RTO: 0 /100 WBCS (ref 0–0)
PLATELET # BLD AUTO: 367 X10*3/UL (ref 150–450)
RBC # BLD AUTO: 4.31 X10*6/UL (ref 4.5–5.9)
VIT B12 SERPL-MCNC: 194 PG/ML (ref 211–911)
WBC # BLD AUTO: 9 X10*3/UL (ref 4.4–11.3)

## 2024-08-28 PROCEDURE — 3079F DIAST BP 80-89 MM HG: CPT | Performed by: INTERNAL MEDICINE

## 2024-08-28 PROCEDURE — 99213 OFFICE O/P EST LOW 20 MIN: CPT | Performed by: INTERNAL MEDICINE

## 2024-08-28 PROCEDURE — 86255 FLUORESCENT ANTIBODY SCREEN: CPT

## 2024-08-28 PROCEDURE — 83516 IMMUNOASSAY NONANTIBODY: CPT

## 2024-08-28 PROCEDURE — 3075F SYST BP GE 130 - 139MM HG: CPT | Performed by: INTERNAL MEDICINE

## 2024-08-28 PROCEDURE — 82607 VITAMIN B-12: CPT

## 2024-08-28 PROCEDURE — 36415 COLL VENOUS BLD VENIPUNCTURE: CPT

## 2024-08-28 PROCEDURE — 85025 COMPLETE CBC W/AUTO DIFF WBC: CPT

## 2024-08-28 NOTE — PROGRESS NOTES
"Chief Complaint:   Follow-up ( Pt went to urgent care yesterday for bilateral wrist pain.)     History Of Present Illness:    Jakob Rasmussen is a 53 y.o. male with active medical problems outlined below who presents for evaluation and management of hematuria and B12 def.       INITIAL VISIT 5/21/24    Jakob has a history of high blood pressure treated with amlodipine.  He has not seen his primary care doctor recently and has been off of amlodipine for couple of years.  Recently has been experiencing retro-orbital, temporal and occipital headaches.  He has a family history of aneurysms and that triggered him to come in and get his blood pressure checked.  His headaches are associate with nausea, photophobia and phonophobia.  His headaches are never exertional.  He also reports intermittent vision changes.  He occasionally sees \"gutierrez of light\" across his visual fields.  They happen 2-3 times per month.  He had an eye exam about a year ago and the provider told him that his exam was normal.  Is unclear if he met with an optometrist or ophthalmologist.  Jakob was evaluated for abdominal pain in the emergency room in January.  His lab evaluation demonstrated macrocytic anemia.  He is a longstanding smoker and smoked more than one pack per day for over 30 years - quit over the past year.  He has never had lung cancer screening.  He is not coughing or feeling out of breath.  She of eczema and now has 2 \"rough patches on his right ankle.  He suspects that the rough patches are eczema.    Echocardiogram 8/29/19  CONCLUSIONS:   1. The left ventricular systolic function is normal with a 55-60% estimated ejection fraction.   2. RVSP within normal limits.   3. No prior echocardiogram available for comparison.    8/29/19  CT angio abdomen/chest   Vascular:  1. No acute vascular pathology. Specifically there is no evidence of  aortic dissection.   Infrarenal abdominal aorta demonstrates diffuse  atherosclerotic change without " significant focal stenosis or aneurysm.  Right common iliac artery demonstrates diffuse atherosclerotic change  without significant focal stenosis.     Nonvascular:  1. There is a 3.8 cm stone noted at the gallbladder neck. Gallbladder  is also distended. Early acute cholecystitis cannot be excluded.  There are also areas of hyperemia in the liver adjacent to the  gallbladder which could be hyperemic reactive to inflammatory process  in the gallbladder. Further evaluation by obtaining right upper  quadrant ultrasound is recommended for better characterization if  clinically indicated.    INTERVAL HISTORY 6/4/24  Recently started amlodipine - no side effects and no recent headaches   New B12 deficiency on labs- some nausea, no vomiting, no bloating/cramping or loose stool.  Recent UA showed 3-5 RBC.  No symptoms but has hx of smoking.  Hyperpigmented macules on ankle are unchanged.  No response to topical steroid cream.      INTERVAL HISTORY 8/28/24  Quit smoking this year   Still having floaters - last eye exam in February  No flashes of light - describing scintillating shapes moving across visual field  Had 3-5 RBC per HPF on UA and had hematuria workup.  CTU negative, did not make cysto appt   Now having pain in right mid wrist - radiates to palm and first 3 fingers   Urgent care ordered steroids        Patient Active Problem List   Diagnosis    Hypertension    Eczema    Peripheral vascular disease (CMS-HCC)    Anemia    Benign prostatic hyperplasia with urinary frequency    Dyslipidemia    Former smoker    Routine health maintenance    B12 deficiency    Nausea    Skin change    Microscopic hematuria    Bilateral carpal tunnel syndrome       Current Outpatient Medications:     amLODIPine (Norvasc) 10 mg tablet, Take 1 tablet (10 mg) by mouth once daily., Disp: 30 tablet, Rfl: 5    clobetasol (Temovate) 0.05 % ointment, Use daily for 6 weeks in evening, can cover with band aid or saran wrap at night time, Disp: 60  g, Rfl: 2    cyanocobalamin (Vitamin B-12) 100 mcg tablet, Take 1 tablet (100 mcg) by mouth once daily., Disp: 30 tablet, Rfl: 11    triamcinolone (Kenalog) 0.1 % ointment, Apply topically 2 times a day as needed for irritation or rash., Disp: 30 g, Rfl: 3  Patient has no known allergies.  Social History     Tobacco Use    Smoking status: Former     Types: Cigarettes     Start date: 2024     Quit date: 1993     Years since quittin.6    Smokeless tobacco: Never   Vaping Use    Vaping status: Never Used   Substance Use Topics    Alcohol use: Yes     Alcohol/week: 2.0 standard drinks of alcohol     Types: 2 Standard drinks or equivalent per week     Comment: occasional/weekends         All pertinent aspects of medical and surgical history were reviewed and updated in chart    Review of Systems   Pertinent positives in review of systems outlined above.  Complete ROS otherwise negative.        Last Recorded Vitals:  No data found.         Physical Exam  HENT:      Mouth/Throat:      Pharynx: Oropharynx is clear.   Eyes:      Extraocular Movements: Extraocular movements intact.      Conjunctiva/sclera: Conjunctivae normal.      Pupils: Pupils are equal, round, and reactive to light.   Cardiovascular:      Rate and Rhythm: Normal rate and regular rhythm.      Pulses: Normal pulses.      Heart sounds: Normal heart sounds.   Pulmonary:      Effort: Pulmonary effort is normal.      Breath sounds: Normal breath sounds.   Musculoskeletal:      Comments: Pos tinnel sign on right , borderline pos on left            The 10-year ASCVD risk score (Irina DK, et al., 2019) is: 12.7%    Values used to calculate the score:      Age: 53 years      Sex: Male      Is Non- : Yes      Diabetic: No      Tobacco smoker: No      Systolic Blood Pressure: 134 mmHg      Is BP treated: Yes      HDL Cholesterol: 37.3 mg/dL      Total Cholesterol: 185 mg/dL      Assessment/Plan   Problem List Items Addressed This  Visit       Hypertension     BP top normal.  To continue work on plant based diet with sodium restriction and exercise.  Repeat BP in 3mo         B12 deficiency     On oral B12.  If no improvement, will switch to IM B12 injections.  Will check TTG Ab and H Pylori fecal antigen.          Microscopic hematuria - Primary     Referred back to urology for cystoscopy         Relevant Orders    Referral to Urology    Bilateral carpal tunnel syndrome     Recommend wrist splints and OTC NSAIDs as needed.  Will move forward with EMG if not improving.             A total of 20 minutes was spent reviewing the chart and recent testing and discussing plan of care.         Ramon Shaw MD

## 2024-08-29 LAB — TTG IGA SER IA-ACNC: <1 U/ML

## 2024-08-30 ENCOUNTER — TELEPHONE (OUTPATIENT)
Dept: PRIMARY CARE | Facility: CLINIC | Age: 54
End: 2024-08-30

## 2024-08-30 DIAGNOSIS — E53.8 B12 DEFICIENCY: Primary | ICD-10-CM

## 2024-08-30 RX ORDER — CYANOCOBALAMIN 1000 UG/ML
1000 INJECTION, SOLUTION INTRAMUSCULAR; SUBCUTANEOUS
Status: CANCELLED | OUTPATIENT
Start: 2024-08-30 | End: 2024-09-27

## 2024-08-31 LAB — PCA IGG SER-ACNC: 29 UNITS (ref 0–24.9)

## 2024-08-31 NOTE — TELEPHONE ENCOUNTER
Please inform Brayan that his B12 level is still low.  He is not absorbing the oral B12 and should start injections - once weekly for a month and then once monthly.  Orders in chart.  He should have blood work repeated in 3mo

## 2024-09-02 PROBLEM — G56.03 BILATERAL CARPAL TUNNEL SYNDROME: Status: ACTIVE | Noted: 2024-09-02

## 2024-09-02 PROBLEM — R31.29 MICROSCOPIC HEMATURIA: Status: ACTIVE | Noted: 2024-06-06

## 2024-09-03 NOTE — ASSESSMENT & PLAN NOTE
On oral B12.  If no improvement, will switch to IM B12 injections.  Will check TTG Ab and H Pylori fecal antigen.

## 2024-09-03 NOTE — ASSESSMENT & PLAN NOTE
BP top normal.  To continue work on plant based diet with sodium restriction and exercise.  Repeat BP in 3mo

## 2024-09-13 ENCOUNTER — OFFICE VISIT (OUTPATIENT)
Dept: ORTHOPEDIC SURGERY | Facility: HOSPITAL | Age: 54
End: 2024-09-13
Payer: COMMERCIAL

## 2024-09-13 ENCOUNTER — OFFICE VISIT (OUTPATIENT)
Dept: PRIMARY CARE | Facility: CLINIC | Age: 54
End: 2024-09-13
Payer: COMMERCIAL

## 2024-09-13 DIAGNOSIS — E53.8 B12 DEFICIENCY: Primary | ICD-10-CM

## 2024-09-13 DIAGNOSIS — G56.01 CARPAL TUNNEL SYNDROME, RIGHT: ICD-10-CM

## 2024-09-13 PROCEDURE — 1036F TOBACCO NON-USER: CPT | Performed by: ORTHOPAEDIC SURGERY

## 2024-09-13 PROCEDURE — 96372 THER/PROPH/DIAG INJ SC/IM: CPT | Performed by: INTERNAL MEDICINE

## 2024-09-13 PROCEDURE — 99203 OFFICE O/P NEW LOW 30 MIN: CPT | Performed by: ORTHOPAEDIC SURGERY

## 2024-09-13 PROCEDURE — 99213 OFFICE O/P EST LOW 20 MIN: CPT | Performed by: ORTHOPAEDIC SURGERY

## 2024-09-13 PROCEDURE — 1036F TOBACCO NON-USER: CPT | Performed by: INTERNAL MEDICINE

## 2024-09-13 RX ORDER — CYANOCOBALAMIN 1000 UG/ML
1000 INJECTION, SOLUTION INTRAMUSCULAR; SUBCUTANEOUS
Status: SHIPPED | OUTPATIENT
Start: 2024-09-13 | End: 2024-10-11

## 2024-09-13 NOTE — PROGRESS NOTES
CHIEF COMPLAINT         Right CTS    ASSESSMENT + PLAN    Bilateral carpal tunnel syndrome, worse on the right, improving with splint and prednisone    The nature of carpal tunnel syndrome was reviewed, along with the slowly progressive natural history.  The options for management were reviewed, including night splinting, cortisone injection, or surgical carpal tunnel release.  The major benefits and risks of surgery were specifically reviewed, as was the postoperative rehabilitation course.    Before trying anything invasive, the patient wanted to try a more regular course of night splinting.  Proper splint use was reviewed.  Followup in 4 weeks if things are not improving to their satisfaction.        HISTORY OF PRESENT ILLNESS       Patient is a 53 y.o. right-hand dominant male , who presents today for evaluation of bilateral hand and wrist pain.  This began about 3 months ago.  It is numb and tingling in character, and occasionally burning.  It radiates from the volar wrist down to the thumb, index, and long finger.  It started as he was doing a lot of floor stripping and waxing.  There was no single specific recalled direct trauma to the wrist.  No popping, clicking, or instability.  No noted weakness.  It was waking him from sleep with a positive shake sign.  He was given a prednisone course and wrist splints.  The symptoms have been significantly improved over the last week or 2.    He is not diabetic or hypothyroid.  He does not smoke.      REVIEW OF SYSTEMS       A 30-item multi-system Review Of Systems was obtained on today's intake form.  This was reviewed with the patient and is correct.  The pertinent positives and negatives are listed above.  The form has been scanned separately into the medical record.      PHYSICAL EXAM    Constitutional:    Appears stated age. Well-developed and well-nourished overweight male in no acute distress.  Psychiatric:         Pleasant normal mood and affect.  Behavior is appropriate for the situation.   Head:                   Normocephalic and atraumatic.  Eyes:                    Pupils are equal and round.  Cardiovascular:  2+ radial and ulnar pulses. Fingers well-perfused.  Respiratory:        Effort normal. No respiratory distress. Speaking in complete sentences.  Neurologic:       Alert and oriented to person, place, and time.  Skin:                Skin is intact, warm and dry.  Hematologic / Lymphatic:    No lymphedema or lymphangitis.    Extremities / Musculoskeletal:                      Skin of both hands and wrists is intact with no erythema, ecchymosis, or diffuse swelling.  Normal skin drag and coloration.  5/5 APB and hand intrinsics with no wasting.  Positive Phalen and Durkan, much more brisk on the right.  Negative Tinel at wrist and elbow.  Negative elbow flexion test.  Cervical range of motion is good and does not reproduce chief complaint.  Sensation intact to light touch in all distributions.  Capillary refill less than 2 seconds.      IMAGING / LABS / EMGs           None pertinent      Past Medical History:   Diagnosis Date    Acute calculous cholecystitis 09/2019    Eczema     Hypertension     Peripheral vascular disease (CMS-HCC)     found incidentally on CTA 8/29/19       Medication Documentation Review Audit       Reviewed by Ramon Shaw MD (Physician) on 08/28/24 at 1406      Medication Order Taking? Sig Documenting Provider Last Dose Status   amLODIPine (Norvasc) 10 mg tablet [id number] Yes Take 1 tablet (10 mg) by mouth once daily. Ramon Shaw MD Taking Active   clobetasol (Temovate) 0.05 % ointment [id number] Yes Use daily for 6 weeks in evening, can cover with band aid or saran wrap at night time Delgado Bautista MD Taking Active   cyanocobalamin (Vitamin B-12) 100 mcg tablet [id number] Yes Take 1 tablet (100 mcg) by mouth once daily. Ramon Shaw MD Taking Active   triamcinolone (Kenalog) 0.1 % ointment [id number] Yes Apply topically 2 times  a day as needed for irritation or rash. Ramon Shaw MD Taking Active                    No Known Allergies    Social History     Socioeconomic History    Marital status: Single     Spouse name: Not on file    Number of children: Not on file    Years of education: Not on file    Highest education level: Not on file   Occupational History    Occupation: [employer] - day shift, weekends on, off Mon, Tu   Tobacco Use    Smoking status: Former     Types: Cigarettes     Start date: 2024     Quit date: 1993     Years since quittin.7    Smokeless tobacco: Never   Vaping Use    Vaping status: Never Used   Substance and Sexual Activity    Alcohol use: Yes     Alcohol/week: 2.0 standard drinks of alcohol     Types: 2 Standard drinks or equivalent per week     Comment: occasional/weekends    Drug use: Not on file    Sexual activity: Not on file     Comment: марина 10 yrs, Celsa Schmidt   Other Topics Concern    Not on file   Social History Narrative    No regular exercise      Social Determinants of Health     Financial Resource Strain: Low Risk  (2023)    Received from [facility]    Overall Financial Resource Strain (CARDIA)     Difficulty of Paying Living Expenses: Not very hard   Food Insecurity: Food Insecurity Present (2023)    Received from [facility]    Hunger Vital Sign     Worried About Running Out of Food in the Last Year: Sometimes true     Ran Out of Food in the Last Year: Sometimes true   Transportation Needs: No Transportation Needs (2023)    Received from [facility]    PRAPARE - Transportation     Lack of Transportation (Medical): No     Lack of Transportation (Non-Medical): No   Physical Activity: Sufficiently Active (2023)    Received from [facility]    Exercise Vital Sign     Days of Exercise per Week: 5 days     Minutes of Exercise per Session: 60 min    Stress: No Stress Concern Present (8/25/2023)    [facility]    [place] [place] [employer] - Occupational Stress Questionnaire     Feeling of Stress : Only a little   Social Connections: Moderately Integrated (8/25/2023)    Received from [facility]    Social Connection and Isolation Panel [NHANES]     Frequency of Communication with Friends and Family: More than three times a week     Frequency of Social Gatherings with Friends and Family: More than three times a week     Attends L[employer] day Services: 1 to 4 times per year     Active Member of Clubs or Organizations: No     Attends Club or Organization Meetings: 1 to 4 times per year     Marital Status: Never    Intimate Partner Violence: Not on file   Housing Stability: Low Risk  (8/25/2023)    Received from [facility]    Housing Stability Vital Sign     Unable to Pay for Housing in the Last Year: No     Number of Places Lived in the Last Year: 1     Unstable Housing in the Last Year: No       Past Surgical History:   Procedure Laterality Date    CHOLECYSTECTOMY      CT ABDOMEN ANGIOGRAM W AND/OR WO IV CONTRAST  08/29/2019    CT ABDOMEN ANGIOGRAM W AND/OR WO IV CONTRAST 8/29/2019 [facility] EMERGENCY LEGACY         Electronically Signed      PETE Morgan MD      Orthopaedic Hand Surgery      [phone]

## 2024-09-13 NOTE — PROGRESS NOTES
Brayan has B12 deficiency.  He did not respond to oral supplementation, and he presents today for his first intramuscular B12 injection.

## 2024-09-13 NOTE — LETTER
September 14, 2024     Ramon Shaw MD  [address] [place] [address] Dr Amaya Gaxiola [place], [facility] [address]  [facility] [address]    Patient: Jakob Rasmussen   YOB: 1970   Date of Visit: 9/13/2024       Dear Dr. Ramon Shaw MD:    Thank you for referring Jakob Rasmussen to me for evaluation. Below are my notes for this consultation.  If you have questions, please do not hesitate to call me. I look forward to following your patient along with you.       Sincerely,     Capo Morgan MD      CC: No Recipients  ______________________________________________________________________________________    CHIEF COMPLAINT         Right CTS    ASSESSMENT + PLAN    Bilateral carpal tunnel syndrome, worse on the right, improving with splint and prednisone    The nature of carpal tunnel syndrome was reviewed, along with the slowly progressive natural history.  The options for management were reviewed, including night splinting, cortisone injection, or surgical carpal tunnel release.  The major benefits and risks of surgery were specifically reviewed, as was the postoperative rehabilitation course.    Before trying anything invasive, the patient wanted to try a more regular course of night splinting.  Proper splint use was reviewed.  Followup in 4 weeks if things are not improving to their satisfaction.        HISTORY OF PRESENT ILLNESS       Patient is a 53 y.o. right-hand dominant male , who presents today for evaluation of bilateral hand and wrist pain.  This began about 3 months ago.  It is numb and tingling in character, and occasionally burning.  It radiates from the volar wrist down to the thumb, index, and long finger.  It started as he was doing a lot of floor stripping and waxing.  There was no single specific recalled direct trauma to the wrist.  No popping, clicking, or instability.  No noted weakness.  It was waking him from sleep with a positive shake sign.  He was given a prednisone course and wrist  splints.  The symptoms have been significantly improved over the last week or 2.    He is not diabetic or hypothyroid.  He does not smoke.      REVIEW OF SYSTEMS       A 30-item multi-system Review Of Systems was obtained on today's intake form.  This was reviewed with the patient and is correct.  The pertinent positives and negatives are listed above.  The form has been scanned separately into the medical record.      PHYSICAL EXAM    Constitutional:    Appears stated age. Well-developed and well-nourished overweight male in no acute distress.  Psychiatric:         Pleasant normal mood and affect. Behavior is appropriate for the situation.   Head:                   Normocephalic and atraumatic.  Eyes:                    Pupils are equal and round.  Cardiovascular:  2+ radial and ulnar pulses. Fingers well-perfused.  Respiratory:        Effort normal. No respiratory distress. Speaking in complete sentences.  Neurologic:       Alert and oriented to person, place, and time.  Skin:                Skin is intact, warm and dry.  Hematologic / Lymphatic:    No lymphedema or lymphangitis.    Extremities / Musculoskeletal:                      Skin of both hands and wrists is intact with no erythema, ecchymosis, or diffuse swelling.  Normal skin drag and coloration.  5/5 APB and hand intrinsics with no wasting.  Positive Phalen and Durkan, much more brisk on the right.  Negative Tinel at wrist and elbow.  Negative elbow flexion test.  Cervical range of motion is good and does not reproduce chief complaint.  Sensation intact to light touch in all distributions.  Capillary refill less than 2 seconds.      IMAGING / LABS / EMGs           None pertinent      Past Medical History:   Diagnosis Date   • Acute calculous cholecystitis 09/2019   • Eczema    • Hypertension    • Peripheral vascular disease (CMS-HCC)     found incidentally on CTA 8/29/19       Medication Documentation Review Audit       Reviewed by Ramon Shaw MD  (Physician) on 24 at 1406      Medication Order Taking? Sig Documenting Provider Last Dose Status   amLODIPine (Norvasc) 10 mg tablet [id number] Yes Take 1 tablet (10 mg) by mouth once daily. Ramon Shaw MD Taking Active   clobetasol (Temovate) 0.05 % ointment [id number] Yes Use daily for 6 weeks in evening, can cover with band aid or saran wrap at night time Delgado Bautista MD Taking Active   cyanocobalamin (Vitamin B-12) 100 mcg tablet [id number] Yes Take 1 tablet (100 mcg) by mouth once daily. Ramon Shaw MD Taking Active   triamcinolone (Kenalog) 0.1 % ointment [id number] Yes Apply topically 2 times a day as needed for irritation or rash. Ramon Shaw MD Taking Active                    No Known Allergies    Social History     Socioeconomic History   • Marital status: Single     Spouse name: Not on file   • Number of children: Not on file   • Years of education: Not on file   • Highest education level: Not on file   Occupational History   • Occupation: [employer] - day shift, weekends on, off Mon, Tues   Tobacco Use   • Smoking status: Former     Types: Cigarettes     Start date: 2024     Quit date: 1993     Years since quittin.7   • Smokeless tobacco: Never   Vaping Use   • Vaping status: Never Used   Substance and Sexual Activity   • Alcohol use: Yes     Alcohol/week: 2.0 standard drinks of alcohol     Types: 2 Standard drinks or equivalent per week     Comment: occasional/weekends   • Drug use: Not on file   • Sexual activity: Not on file     Comment: марина 10 yrs, Celsa Schmidt   Other Topics Concern   • Not on file   Social History Narrative    No regular exercise      Social Determinants of Health     Financial Resource Strain: Low Risk  (2023)    [facility]    Overall Financial Resource Strain (CARDIA)    • Difficulty of Paying Living Expenses: Not very hard   Food Insecurity: Food Insecurity Present (2023)    Received  from [facility]    Hunger Vital Sign    • Worried About Running Out of Food in the Last Year: Sometimes true    • Ran Out of Food in the Last Year: Sometimes true   Transportation Needs: No Transportation Needs (8/25/2023)    Received from [facility]    PRAPARE - Transportation    • Lack of Transportation (Medical): No    • Lack of Transportation (Non-Medical): No   Physical Activity: Sufficiently Active (8/25/2023)    Received from [facility]    Exercise Vital Sign    • Days of Exercise per Week: 5 days    • Minutes of Exercise per Session: 60 min   Stress: No Stress Concern Present (8/25/2023)    Received from [facility]    [place] [employer] - Occupational Stress Questionnaire    • Feeling of Stress : Only a little   Social Connections: Moderately Integrated (8/25/2023)    Received from [facility]    Social Connection and Isolation Panel [NHANES]    • Frequency of Communication with Friends and Family: More than three times a week    • Frequency of Social Gatherings with Friends and Family: More than three times a week    • Attends [employer] Services: 1 to 4 times per year    • Active Member of Clubs or Organizations: No    • Attends Club or Organization Meetings: 1 to 4 times per year    • Marital Status: Never    Intimate Partner Violence: Not on file   Housing Stability: Low Risk  (8/25/2023)    Received from [facility]    Housing Stability Vital Sign    • Unable to Pay for Housing in the Last Year: No    • Number of Places Lived in the Last Year: 1    • Unstable Housing in the Last Year: No       Past Surgical History:   Procedure Laterality Date   • CHOLECYSTECTOMY     • CT ABDOMEN ANGIOGRAM W AND/OR WO IV CONTRAST  08/29/2019    CT ABDOMEN ANGIOGRAM W AND/OR WO IV CONTRAST 8/29/2019 [facility] EMERGENCY LEGACY         Electronically Signed       PETE Morgan MD      Orthopaedic Hand Surgery      [phone]

## 2024-09-20 ENCOUNTER — APPOINTMENT (OUTPATIENT)
Dept: PRIMARY CARE | Facility: CLINIC | Age: 54
End: 2024-09-20
Payer: COMMERCIAL

## 2024-09-27 ENCOUNTER — OFFICE VISIT (OUTPATIENT)
Dept: PRIMARY CARE | Facility: CLINIC | Age: 54
End: 2024-09-27
Payer: COMMERCIAL

## 2024-09-27 DIAGNOSIS — E53.8 B12 DEFICIENCY: Primary | ICD-10-CM

## 2024-09-27 PROCEDURE — 96372 THER/PROPH/DIAG INJ SC/IM: CPT | Performed by: INTERNAL MEDICINE

## 2024-09-28 NOTE — PROGRESS NOTES
Chief Complaint:   No chief complaint on file.     History Of Present Illness:    Jakob Rasmussen is a 53 y.o. male with active medical problems outlined below who presents for B12 injection.   He did not respond to oral B12 supplementation and is now on injections.     Patient Active Problem List   Diagnosis    Hypertension    Eczema    Peripheral vascular disease (CMS-HCC)    Anemia    Benign prostatic hyperplasia with urinary frequency    Dyslipidemia    Former smoker    Routine health maintenance    B12 deficiency    Nausea    Skin change    Microscopic hematuria    Bilateral carpal tunnel syndrome       Current Outpatient Medications:     amLODIPine (Norvasc) 10 mg tablet, Take 1 tablet (10 mg) by mouth once daily., Disp: 30 tablet, Rfl: 5    clobetasol (Temovate) 0.05 % ointment, Use daily for 6 weeks in evening, can cover with band aid or saran wrap at night time, Disp: 60 g, Rfl: 2    cyanocobalamin (Vitamin B-12) 100 mcg tablet, Take 1 tablet (100 mcg) by mouth once daily., Disp: 30 tablet, Rfl: 11    triamcinolone (Kenalog) 0.1 % ointment, Apply topically 2 times a day as needed for irritation or rash., Disp: 30 g, Rfl: 3    Current Facility-Administered Medications:     cyanocobalamin (Vitamin B-12) injection 1,000 mcg, 1,000 mcg, intramuscular, q7 days, Ramon Shaw MD, 1,000 mcg at 24 1547  Patient has no known allergies.  Social History     Tobacco Use    Smoking status: Former     Types: Cigarettes     Start date: 2024     Quit date: 1993     Years since quittin.7    Smokeless tobacco: Never   Vaping Use    Vaping status: Never Used   Substance Use Topics    Alcohol use: Yes     Alcohol/week: 2.0 standard drinks of alcohol     Types: 2 Standard drinks or equivalent per week     Comment: occasional/weekends         All pertinent aspects of medical and surgical history were reviewed and updated in chart         The 10-year ASCVD risk score (Irina FLOR, et al., 2019) is: 12.7%    Values  used to calculate the score:      Age: 53 years      Sex: Male      Is Non- : Yes      Diabetic: No      Tobacco smoker: No      Systolic Blood Pressure: 134 mmHg      Is BP treated: Yes      HDL Cholesterol: 37.3 mg/dL      Total Cholesterol: 185 mg/dL      Assessment/Plan   Problem List Items Addressed This Visit       B12 deficiency - Primary     Continue weekly injections.  Labs after 6 injections.                 Ramon Shaw MD

## 2024-10-01 ENCOUNTER — OFFICE VISIT (OUTPATIENT)
Dept: UROLOGY | Facility: HOSPITAL | Age: 54
End: 2024-10-01
Payer: COMMERCIAL

## 2024-10-01 DIAGNOSIS — R31.29 MICROSCOPIC HEMATURIA: Primary | ICD-10-CM

## 2024-10-01 PROCEDURE — 99204 OFFICE O/P NEW MOD 45 MIN: CPT | Performed by: STUDENT IN AN ORGANIZED HEALTH CARE EDUCATION/TRAINING PROGRAM

## 2024-10-01 PROCEDURE — 99214 OFFICE O/P EST MOD 30 MIN: CPT | Performed by: STUDENT IN AN ORGANIZED HEALTH CARE EDUCATION/TRAINING PROGRAM

## 2024-10-01 NOTE — PROGRESS NOTES
Subjective   Patient ID: Jakob Rasmussen is a 53 y.o. male    HPI  53 y.o. male who presents today for evaluation of blood in the urine. He reports no issues with urination or erections, although he occasionally experiences minor erectile dysfunction. The patient has a history of smoking for about 30 years but has since quit. A recent urine analysis revealed microscopic hematuria. A prior CAT scan showed no acute abnormalities. The next step involves a cystoscopy to further investigate the source of the blood in the urine and rule out any serious conditions such as tumors or masses.         Review of Systems    All systems were reviewed. Anything negative was noted in the HPI.    Objective   Physical Exam    General: Well developed, well nourished, alert and cooperative, appears in no acute distress   Eyes: Non-injected conjunctiva, sclera clear, no proptosis   Cardiac: Extremities are warm and well perfused. No edema, cyanosis or pallor   Lungs: Breathing is easy, non-labored. Speaking in clear and complete sentences. Normal diaphragmatic movement   MSK: Ambulatory with steady gait, unassisted   Neuro: Alert and oriented to person, place, and time   Psych: Demonstrates good judgment and reason, without hallucinations, abnormal affect or abnormal behaviors   Skin: No obvious lesions, no rashes       No CVA tenderness bilaterally   No suprapubic pain or discomfort       Past Medical History:   Diagnosis Date    Acute calculous cholecystitis 09/2019    Eczema     Hypertension     Peripheral vascular disease (CMS-HCC)     found incidentally on CTA 8/29/19         Past Surgical History:   Procedure Laterality Date    CHOLECYSTECTOMY      CT ABDOMEN ANGIOGRAM W AND/OR WO IV CONTRAST  08/29/2019    CT ABDOMEN ANGIOGRAM W AND/OR WO IV CONTRAST 8/29/2019 [facility] EMERGENCY LEGACY         Assessment/Plan   Microscopic hematuria    53 y.o. male who presents for the above condition, We had a very long and extensive discussion  regarding microscopic hematuria. I explained to the patient the pathophysiology, differential diagnosis, risk factor, associated conditions, and management. We discussed the need to do a microhematuria work-up to rule out any underlying  malignancies in the form of masses, tumor, polyps that might be causing the microscopic hematuria. We discussed at length the risk, benefit, potential complication, adverse events of cystoscopy, ultrasound kidneys, and urine cytology. Patient verbalized understanding and would like to proceed.      Plan:  - Follow up in 1 month for Cystoscopic evaluation        10/1/2024    Shraddha Attestation  By signing my name below, I, Shraddha Gomez   attest that this documentation has been prepared under the direction and in the presence of Dr. Paxton Beaulieu

## 2024-10-02 ENCOUNTER — APPOINTMENT (OUTPATIENT)
Dept: PRIMARY CARE | Facility: CLINIC | Age: 54
End: 2024-10-02
Payer: COMMERCIAL

## 2024-10-09 ENCOUNTER — PHARMACY VISIT (OUTPATIENT)
Dept: PHARMACY | Facility: CLINIC | Age: 54
End: 2024-10-09
Payer: MEDICARE

## 2024-10-09 ENCOUNTER — APPOINTMENT (OUTPATIENT)
Dept: PRIMARY CARE | Facility: CLINIC | Age: 54
End: 2024-10-09
Payer: COMMERCIAL

## 2024-10-09 VITALS
DIASTOLIC BLOOD PRESSURE: 93 MMHG | TEMPERATURE: 97.6 F | OXYGEN SATURATION: 96 % | HEART RATE: 71 BPM | WEIGHT: 224.2 LBS | SYSTOLIC BLOOD PRESSURE: 142 MMHG | BODY MASS INDEX: 31.27 KG/M2

## 2024-10-09 DIAGNOSIS — G47.19 EXCESSIVE DAYTIME SLEEPINESS: Primary | ICD-10-CM

## 2024-10-09 DIAGNOSIS — N40.1 BENIGN PROSTATIC HYPERPLASIA WITH URINARY FREQUENCY: ICD-10-CM

## 2024-10-09 DIAGNOSIS — R35.0 BENIGN PROSTATIC HYPERPLASIA WITH URINARY FREQUENCY: ICD-10-CM

## 2024-10-09 DIAGNOSIS — R31.29 MICROSCOPIC HEMATURIA: ICD-10-CM

## 2024-10-09 DIAGNOSIS — E53.8 B12 DEFICIENCY: ICD-10-CM

## 2024-10-09 PROCEDURE — RXMED WILLOW AMBULATORY MEDICATION CHARGE

## 2024-10-09 RX ORDER — TADALAFIL 5 MG/1
5 TABLET ORAL DAILY
Qty: 30 TABLET | Refills: 5 | Status: SHIPPED | OUTPATIENT
Start: 2024-10-09 | End: 2024-10-09

## 2024-10-09 RX ORDER — CYANOCOBALAMIN 1000 UG/ML
1000 INJECTION, SOLUTION INTRAMUSCULAR; SUBCUTANEOUS ONCE
Qty: 1 ML | Refills: 1 | Status: SHIPPED | OUTPATIENT
Start: 2024-10-09 | End: 2024-10-09 | Stop reason: WASHOUT

## 2024-10-09 RX ORDER — TADALAFIL 5 MG/1
5 TABLET ORAL DAILY
Qty: 30 TABLET | Refills: 5 | Status: SHIPPED | OUTPATIENT
Start: 2024-10-09 | End: 2024-10-10 | Stop reason: SDUPTHER

## 2024-10-09 RX ORDER — CYANOCOBALAMIN 1000 UG/ML
1000 INJECTION, SOLUTION INTRAMUSCULAR; SUBCUTANEOUS ONCE
Qty: 1 ML | Refills: 0 | Status: SHIPPED | OUTPATIENT
Start: 2024-10-09 | End: 2024-10-10

## 2024-10-09 NOTE — PROGRESS NOTES
"Chief Complaint:   Follow-up ( Out of B-12 shot)     History Of Present Illness:    Jakob Rasmussen is a 53 y.o. male with active medical problems outlined below who presents for follow up of B12 def and HTN.      INITIAL VISIT 5/21/24  Jakob has a history of high blood pressure treated with amlodipine.  He has not seen his primary care doctor recently and has been off of amlodipine for couple of years.  Recently has been experiencing retro-orbital, temporal and occipital headaches.  He has a family history of aneurysms and that triggered him to come in and get his blood pressure checked.  His headaches are associate with nausea, photophobia and phonophobia.  His headaches are never exertional.  He also reports intermittent vision changes.  He occasionally sees \"gutierrez of light\" across his visual fields.  They happen 2-3 times per month.  He had an eye exam about a year ago and the provider told him that his exam was normal.  Is unclear if he met with an optometrist or ophthalmologist.  Jakob was evaluated for abdominal pain in the emergency room in January.  His lab evaluation demonstrated macrocytic anemia.  He is a longstanding smoker and smoked more than one pack per day for over 30 years - quit over the past year.  He has never had lung cancer screening.  He is not coughing or feeling out of breath.  She of eczema and now has 2 \"rough patches on his right ankle.  He suspects that the rough patches are eczema.    Echocardiogram 8/29/19  CONCLUSIONS:   1. The left ventricular systolic function is normal with a 55-60% estimated ejection fraction.   2. RVSP within normal limits.   3. No prior echocardiogram available for comparison.    8/29/19  CT angio abdomen/chest   Vascular:  1. No acute vascular pathology. Specifically there is no evidence of  aortic dissection.   Infrarenal abdominal aorta demonstrates diffuse  atherosclerotic change without significant focal stenosis or aneurysm.  Right common iliac artery " demonstrates diffuse atherosclerotic change  without significant focal stenosis.     Nonvascular:  1. There is a 3.8 cm stone noted at the gallbladder neck. Gallbladder  is also distended. Early acute cholecystitis cannot be excluded.  There are also areas of hyperemia in the liver adjacent to the  gallbladder which could be hyperemic reactive to inflammatory process  in the gallbladder. Further evaluation by obtaining right upper  quadrant ultrasound is recommended for better characterization if  clinically indicated.      INTERVAL HISTORY 8/28/24  Quit smoking this year   Still having floaters - last eye exam in February  No flashes of light - describing scintillating shapes moving across visual field  Had 3-5 RBC per HPF on UA and had hematuria workup.  CTU negative, did not make cysto appt   Now having pain in right mid wrist - radiates to palm and first 3 fingers   Urgent care ordered steroids       INTERVAL HISTORY 10/9/24  Recently evaluated for anemia and diagnosed with B12 def.  Now due for 3rd B12 injection.  Feeling well.  Mood and energy level are good.  Some improvement on the injections.   Increased urinary frequency, nocturia 3-4x per nightfor the past  6-8 months   Sometimes feeling like he stops breathing at night.  Partner has observed breathing pauses and irregular breathing.  Can get very sleepy during the day  Recently met with urology for eval of microscoic hematuria and cysto ordered        Patient Active Problem List   Diagnosis    Hypertension    Eczema    Peripheral vascular disease (CMS-HCC)    Anemia    Benign prostatic hyperplasia with urinary frequency    Dyslipidemia    Former smoker    Routine health maintenance    B12 deficiency    Nausea    Skin change    Microscopic hematuria    Bilateral carpal tunnel syndrome    Excessive daytime sleepiness       Current Outpatient Medications:     amLODIPine (Norvasc) 10 mg tablet, Take 1 tablet (10 mg) by mouth once daily., Disp: 30 tablet,  Rfl: 5    clobetasol (Temovate) 0.05 % ointment, Use daily for 6 weeks in evening, can cover with band aid or saran wrap at night time, Disp: 60 g, Rfl: 2    triamcinolone (Kenalog) 0.1 % ointment, Apply topically 2 times a day as needed for irritation or rash., Disp: 30 g, Rfl: 3    cyanocobalamin (Vitamin B-12) 1,000 mcg/mL injection, Inject 1 mL (1,000 mcg) into the muscle 1 time for 1 dose., Disp: 1 mL, Rfl: 0    tadalafil (Cialis) 5 mg tablet, Take 1 tablet (5 mg) by mouth once daily., Disp: 30 tablet, Rfl: 5  No current facility-administered medications for this visit.  Patient has no known allergies.  Social History     Tobacco Use    Smoking status: Former     Types: Cigarettes     Start date: 2024     Quit date: 1993     Years since quittin.7    Smokeless tobacco: Never   Vaping Use    Vaping status: Never Used   Substance Use Topics    Alcohol use: Yes     Alcohol/week: 2.0 standard drinks of alcohol     Types: 2 Standard drinks or equivalent per week     Comment: occasional/weekends       All pertinent aspects of medical and surgical history were reviewed and updated in chart    Review of Systems   Pertinent positives in review of systems outlined above.  Complete ROS otherwise negative.        Last Recorded Vitals:  No data found.         Physical Exam  HENT:      Mouth/Throat:      Pharynx: Oropharynx is clear.   Eyes:      Extraocular Movements: Extraocular movements intact.      Conjunctiva/sclera: Conjunctivae normal.      Pupils: Pupils are equal, round, and reactive to light.   Cardiovascular:      Rate and Rhythm: Normal rate and regular rhythm.      Pulses: Normal pulses.      Heart sounds: Normal heart sounds.   Pulmonary:      Effort: Pulmonary effort is normal.      Breath sounds: Normal breath sounds.   Musculoskeletal:      Right lower leg: No edema.      Left lower leg: No edema.       The 10-year ASCVD risk score (Irina FLOR, et al., 2019) is: 14%    Values used to calculate  the score:      Age: 53 years      Sex: Male      Is Non- : Yes      Diabetic: No      Tobacco smoker: No      Systolic Blood Pressure: 142 mmHg      Is BP treated: Yes      HDL Cholesterol: 37.3 mg/dL      Total Cholesterol: 185 mg/dL      Assessment/Plan   Problem List Items Addressed This Visit       Benign prostatic hyperplasia with urinary frequency     PSA up to date.  Hematuria workup on going. Discussed adding daily tadalafil to help with LUTS and ED.           Relevant Medications    tadalafil (Cialis) 5 mg tablet    B12 deficiency     To continue B12 injections.  Will check CBC and B12 level after 4th injection.  Anti-parietal cell AB positive.          Relevant Medications    cyanocobalamin (Vitamin B-12) 1,000 mcg/mL injection    Other Relevant Orders    CBC and Auto Differential    Vitamin B12    Microscopic hematuria     Scheduled for cysto with Dr. Montague          Excessive daytime sleepiness - Primary     Will arrange HSAT         Relevant Orders    Home sleep apnea test (HSAT)       A total of 30 minutes was spent reviewing the chart and recent testing and discussing plan of care.         Ramon Shaw MD

## 2024-10-10 ENCOUNTER — TELEPHONE (OUTPATIENT)
Dept: PRIMARY CARE | Facility: CLINIC | Age: 54
End: 2024-10-10

## 2024-10-10 DIAGNOSIS — N40.1 BENIGN PROSTATIC HYPERPLASIA WITH URINARY FREQUENCY: ICD-10-CM

## 2024-10-10 DIAGNOSIS — R35.0 BENIGN PROSTATIC HYPERPLASIA WITH URINARY FREQUENCY: ICD-10-CM

## 2024-10-10 PROBLEM — G47.19 EXCESSIVE DAYTIME SLEEPINESS: Status: ACTIVE | Noted: 2024-10-10

## 2024-10-10 RX ORDER — TADALAFIL 5 MG/1
5 TABLET ORAL DAILY
Qty: 30 TABLET | Refills: 5 | Status: SHIPPED | OUTPATIENT
Start: 2024-10-10

## 2024-10-10 NOTE — ASSESSMENT & PLAN NOTE
To continue B12 injections.  Will check CBC and B12 level after 4th injection.  Anti-parietal cell AB positive.

## 2024-10-10 NOTE — TELEPHONE ENCOUNTER
I resent the order to [facility].  He has a printed copy of the prescription and a good rx card to use if too expensive at [facility].

## 2024-10-10 NOTE — ASSESSMENT & PLAN NOTE
PSA up to date.  Hematuria workup on going. Discussed adding daily tadalafil to help with LUTS and ED.

## 2024-10-10 NOTE — TELEPHONE ENCOUNTER
PATIENT CALLED ABOUT MEDICATION TADALAFIL 5 MG [email or web address]

## 2024-10-16 ENCOUNTER — APPOINTMENT (OUTPATIENT)
Dept: PRIMARY CARE | Facility: CLINIC | Age: 54
End: 2024-10-16
Payer: COMMERCIAL

## 2024-10-22 ENCOUNTER — PROCEDURE VISIT (OUTPATIENT)
Dept: UROLOGY | Facility: HOSPITAL | Age: 54
End: 2024-10-22
Payer: COMMERCIAL

## 2024-10-22 DIAGNOSIS — Z79.2 PROPHYLACTIC ANTIBIOTIC: ICD-10-CM

## 2024-10-22 DIAGNOSIS — R31.29 MICROSCOPIC HEMATURIA: Primary | ICD-10-CM

## 2024-10-22 PROCEDURE — 52000 CYSTOURETHROSCOPY: CPT | Performed by: STUDENT IN AN ORGANIZED HEALTH CARE EDUCATION/TRAINING PROGRAM

## 2024-10-22 PROCEDURE — 99214 OFFICE O/P EST MOD 30 MIN: CPT | Performed by: STUDENT IN AN ORGANIZED HEALTH CARE EDUCATION/TRAINING PROGRAM

## 2024-10-22 RX ORDER — CIPROFLOXACIN 500 MG/1
500 TABLET ORAL ONCE
Status: SHIPPED | OUTPATIENT
Start: 2024-10-22

## 2024-10-22 NOTE — PROGRESS NOTES
Subjective   Patient ID: Jakob Rasmussen is a 53 y.o. male    HPI  53 y.o. male who presents today for cystoscopic evaluation of blood in the urine. He reports no issues with urination or erections, although he occasionally experiences minor erectile dysfunction. The patient has a history of smoking for about 30 years but has since quit. A recent urine analysis revealed microscopic hematuria. A prior CAT scan showed no acute abnormalities. The next step involves a cystoscopy to further investigate the source of the blood in the urine and rule out any serious conditions such as tumors or masses.         Review of Systems    All systems were reviewed. Anything negative was noted in the HPI.    Objective   Physical Exam    General: Well developed, well nourished, alert and cooperative, appears in no acute distress   Eyes: Non-injected conjunctiva, sclera clear, no proptosis   Cardiac: Extremities are warm and well perfused. No edema, cyanosis or pallor   Lungs: Breathing is easy, non-labored. Speaking in clear and complete sentences. Normal diaphragmatic movement   MSK: Ambulatory with steady gait, unassisted   Neuro: Alert and oriented to person, place, and time   Psych: Demonstrates good judgment and reason, without hallucinations, abnormal affect or abnormal behaviors   Skin: No obvious lesions, no rashes       No CVA tenderness bilaterally   No suprapubic pain or discomfort       Past Medical History:   Diagnosis Date    Acute calculous cholecystitis 09/2019    B12 deficiency     positive anti-parietal cell Ab    Eczema     Hypertension     Microscopic hematuria     Peripheral vascular disease (CMS-HCC)     found incidentally on CTA 8/29/19         Past Surgical History:   Procedure Laterality Date    CHOLECYSTECTOMY      CT ABDOMEN ANGIOGRAM W AND/OR WO IV CONTRAST  08/29/2019    CT ABDOMEN ANGIOGRAM W AND/OR WO IV CONTRAST 8/29/2019 [facility]       Procedure:  The patient was prepped using a Betadine  solution. Lidocaine jelly was instilled into the urethra. The flexible cystoscope was sterilely inserted into the urethra and formal cystoscopy performed in a systematic fashion. For detailed findings of the procedure, please see Dr. Beaulieu’s remarks below  Scope A used, Cipro 500 mg p.o. given    Assessment/Plan   Cystoscopic evaluation for Microscopic hematuria revealing erythematous posterior bladder wall mucosa cold be CIS Vs inflammation     53 y.o. male who presents for the above condition, We had a very long and extensive discussion regarding microscopic hematuria. I explained to the patient the pathophysiology, differential diagnosis, risk factor, associated conditions, and management. We discussed the need to do a microhematuria work-up to rule out any underlying  malignancies in the form of masses, tumor, polyps that might be causing the microscopic hematuria. We discussed at length the risk, benefit, potential complication, adverse events of cystoscopy, and urine cytology. Patient verbalized understanding and would like to proceed.      We discussed TURBt and bladder bx Vs repeat cysto. Pt opted to repeat cysto in 6 months.     Plan:  - Urine cytology  - Follow up in 6 months         E&M visit today is associated with current or anticipated ongoing medical care services related to a patient's single, serious condition or a complex condition.       10/22/2024    Scribe Attestation  By signing my name below, I, Shraddha Gomez   attest that this documentation has been prepared under the direction and in the presence of Dr. Paxton Beaulieu

## 2024-10-22 NOTE — PROGRESS NOTES
Patient ID: Jakob Rasmussen is a 53 y.o. male.    Cystoscopy    Date/Time: 10/22/2024 12:32 PM    Performed by: Paxton Beaulieu MD MPH  Authorized by: Paxton Beaulieu MD MPH    Procedure - Bladder Cystoscopy:     Procedure details: cystoscopy    PROCEDURE NOTE:    PREOPERATIVE DIAGNOSIS:  Hematuria     POSTOPERATIVE DIAGNOSIS:  Same    OPERATION:  Flexible Cystourethroscopy      SURGEON:  Paxton Beaulieu MD MPH    ANESTHESIA:  2%  lidocaine jelly    COMPLICATIONS:  None    EBL: Minimal    SPECIMEN:  Voided urine was collected and submitted for cytology.    DISPOSITION:  The patient was discharged home after the procedure, per routine.    INDICATIONS: :  Mr. Rasmussen is a 53 y.o. patient with a history of hematuria who presents today for Cystoscopy.     The indications, risks and benefits of this procedure were discussed with the patient, consent was obtained prior to the procedure, and to the best of my judgement the patient seemed to understand and agree to the procedure.    PROCEDURE:  The patient  was brought into the procedure suite and informed consent was reviewed and confirmed. Vital signs were obtained prior to the procedure: There were no vitals taken for this visit..  The patient was escorted onto the stretcher, placed supine, prepped with betadine and draped in the usual standard surgical fashion.  Intraurethral 2% viscous lidocaine jelly was used for local analgesia.  A 16 [place] flexible cystourethroscope was inserted into the urethra.   The penile urethra was normal.  The prostate urethra was enlarged.  Upon entering the bladder the entire bladder was surveyed in a 360 degree fashion.  The left and right ureteral orifices were in normal orthotopic position effluxing clear yellow urine, bilaterally.   There was evidence of bladder lesions erythematous posterior bladder wall mucosa cold be CIS Vs inflammation , NO foreign objects, stones or evidence of any mucosal changes. The cystoscope was then  retroflexed.  The bladder neck was then further examined without any evidence of lesions. The scope was then removed and in an antegrade fashion, the urethra and bladder were again resurveyed with no evidence of additional lesions.  The cystoscope was then fully removed.   The patient tolerated the procedure well.  Vitals were stable after the procedure.  The patient was able to void and was discharged home.  Verbal and written Post procedure instructions were reviewed with the patient.    IMPRESSION:  erythematous posterior bladder wall mucosa cold be CIS Vs inflammation     PLAN:  Cysto in 6 months Vs TURBT discussed

## 2024-10-24 LAB
LABORATORY COMMENT REPORT: NORMAL
LABORATORY COMMENT REPORT: NORMAL
PATH REPORT.FINAL DX SPEC: NORMAL
PATH REPORT.GROSS SPEC: NORMAL
PATH REPORT.RELEVANT HX SPEC: NORMAL
PATH REPORT.TOTAL CANCER: NORMAL

## 2024-11-13 ENCOUNTER — APPOINTMENT (OUTPATIENT)
Dept: PRIMARY CARE | Facility: CLINIC | Age: 54
End: 2024-11-13
Payer: COMMERCIAL

## 2025-04-17 NOTE — PROGRESS NOTES
Subjective   Patient ID: Jakob Rasmussen is a 54 y.o. male    HPI  54 y.o. male who presents today for cystoscopic evaluation with hematuria. He reports no issues with urination or erections, although he occasionally experiences minor erectile dysfunction. The patient has a history of smoking for about 30 years but has since quit. A recent urine analysis revealed microscopic hematuria. A prior CAT scan showed no acute abnormalities. The next step involves a cystoscopy to further investigate the source of the blood in the urine and rule out any serious conditions such as tumors or masses.     Today, he ***    The most recent Urine Cytology, conducted on 10/22/2024, revealed:  A. URINE CLEAN CATCH  Adequacy: Satisfactory for evaluation       Review of Systems    All systems were reviewed. Anything negative was noted in the HPI.    Objective   Physical Exam    General: Well developed, well nourished, alert and cooperative, appears in no acute distress   Eyes: Non-injected conjunctiva, sclera clear, no proptosis   Cardiac: Extremities are warm and well perfused. No edema, cyanosis or pallor   Lungs: Breathing is easy, non-labored. Speaking in clear and complete sentences. Normal diaphragmatic movement   MSK: Ambulatory with steady gait, unassisted   Neuro: Alert and oriented to person, place, and time   Psych: Demonstrates good judgment and reason, without hallucinations, abnormal affect or abnormal behaviors   Skin: No obvious lesions, no rashes       No CVA tenderness bilaterally   No suprapubic pain or discomfort       Past Medical History:   Diagnosis Date    Acute calculous cholecystitis 09/2019    B12 deficiency     positive anti-parietal cell Ab    Eczema     Hypertension     Microscopic hematuria     Peripheral vascular disease (CMS-HCC)     found incidentally on CTA 8/29/19         Past Surgical History:   Procedure Laterality Date    CHOLECYSTECTOMY      CT ABDOMEN ANGIOGRAM W AND/OR WO IV CONTRAST  08/29/2019     CT ABDOMEN ANGIOGRAM W AND/OR WO IV CONTRAST 8/29/2019 [facility]GENCY LEGACY     Procedure:  The patient was prepped using a Betadine solution. Lidocaine jelly was instilled into the urethra. The flexible cystoscope was sterilely inserted into the urethra and formal cystoscopy performed in a systematic fashion. For detailed findings of the procedure, please see Dr. Beaulieu’s remarks below  Scope A used, Cipro 500 mg p.o. given    Assessment/Plan   Cystoscopic evaluation for Microscopic hematuria revealing erythematous posterior bladder wall mucosa cold be CIS Vs inflammation     54 y.o. male who presents for the above condition, We had a very long and extensive discussion regarding microscopic hematuria. I explained to the patient the pathophysiology, differential diagnosis, risk factor, associated conditions, and management. We discussed the need to do a microhematuria work-up to rule out any underlying  malignancies in the form of masses, tumor, polyps that might be causing the microscopic hematuria. We discussed at length the risk, benefit, potential complication, adverse events of cystoscopy, and urine cytology. Patient verbalized understanding and would like to proceed.      We discussed TURBt and bladder bx Vs repeat cysto. Pt opted to repeat cysto in 6 months.     Plan:  - ***          E&M visit today is associated with current or anticipated ongoing medical care services related to a patient's single, serious condition or a complex condition.       4/22/2025    Scribe Attestation  By signing my name below, I, Shraddha Sullivan attest that this documentation has been prepared under the direction and in the presence of Dr. Paxton Beaulieu.

## 2025-04-22 ENCOUNTER — APPOINTMENT (OUTPATIENT)
Dept: UROLOGY | Facility: HOSPITAL | Age: 55
End: 2025-04-22
Payer: COMMERCIAL

## 2025-05-27 ENCOUNTER — APPOINTMENT (OUTPATIENT)
Dept: UROLOGY | Facility: HOSPITAL | Age: 55
End: 2025-05-27
Payer: COMMERCIAL